# Patient Record
Sex: FEMALE | URBAN - METROPOLITAN AREA
[De-identification: names, ages, dates, MRNs, and addresses within clinical notes are randomized per-mention and may not be internally consistent; named-entity substitution may affect disease eponyms.]

---

## 2022-03-07 ENCOUNTER — APPOINTMENT (RX ONLY)
Dept: URBAN - METROPOLITAN AREA CLINIC 374 | Facility: CLINIC | Age: 32
Setting detail: DERMATOLOGY
End: 2022-03-07

## 2022-03-07 DIAGNOSIS — D22 MELANOCYTIC NEVI: ICD-10-CM

## 2022-03-07 DIAGNOSIS — L81.4 OTHER MELANIN HYPERPIGMENTATION: ICD-10-CM

## 2022-03-07 DIAGNOSIS — D18.0 HEMANGIOMA: ICD-10-CM

## 2022-03-07 DIAGNOSIS — D485 NEOPLASM OF UNCERTAIN BEHAVIOR OF SKIN: ICD-10-CM

## 2022-03-07 DIAGNOSIS — Z71.89 OTHER SPECIFIED COUNSELING: ICD-10-CM

## 2022-03-07 PROBLEM — D23.39 OTHER BENIGN NEOPLASM OF SKIN OF OTHER PARTS OF FACE: Status: ACTIVE | Noted: 2022-03-07

## 2022-03-07 PROBLEM — D22.61 MELANOCYTIC NEVI OF RIGHT UPPER LIMB, INCLUDING SHOULDER: Status: ACTIVE | Noted: 2022-03-07

## 2022-03-07 PROBLEM — D22.5 MELANOCYTIC NEVI OF TRUNK: Status: ACTIVE | Noted: 2022-03-07

## 2022-03-07 PROBLEM — D22.62 MELANOCYTIC NEVI OF LEFT UPPER LIMB, INCLUDING SHOULDER: Status: ACTIVE | Noted: 2022-03-07

## 2022-03-07 PROBLEM — D22.71 MELANOCYTIC NEVI OF RIGHT LOWER LIMB, INCLUDING HIP: Status: ACTIVE | Noted: 2022-03-07

## 2022-03-07 PROBLEM — D48.5 NEOPLASM OF UNCERTAIN BEHAVIOR OF SKIN: Status: ACTIVE | Noted: 2022-03-07

## 2022-03-07 PROBLEM — D18.01 HEMANGIOMA OF SKIN AND SUBCUTANEOUS TISSUE: Status: ACTIVE | Noted: 2022-03-07

## 2022-03-07 PROBLEM — D22.72 MELANOCYTIC NEVI OF LEFT LOWER LIMB, INCLUDING HIP: Status: ACTIVE | Noted: 2022-03-07

## 2022-03-07 PROCEDURE — ? ADDITIONAL NOTES

## 2022-03-07 PROCEDURE — ? SUNSCREEN RECOMMENDATIONS

## 2022-03-07 PROCEDURE — ? FULL BODY SKIN EXAM

## 2022-03-07 PROCEDURE — 99203 OFFICE O/P NEW LOW 30 MIN: CPT

## 2022-03-07 PROCEDURE — ? DEFER

## 2022-03-07 PROCEDURE — ? PHOTO-DOCUMENTATION

## 2022-03-07 PROCEDURE — ? COUNSELING

## 2022-03-07 ASSESSMENT — LOCATION DETAILED DESCRIPTION DERM
LOCATION DETAILED: RIGHT ANTERIOR PROXIMAL UPPER ARM
LOCATION DETAILED: LEFT ANTERIOR DISTAL THIGH
LOCATION DETAILED: LEFT SUPERIOR MEDIAL UPPER BACK
LOCATION DETAILED: RIGHT ANTERIOR DISTAL UPPER ARM
LOCATION DETAILED: RIGHT MEDIAL SUPERIOR CHEST
LOCATION DETAILED: RIGHT DISTAL DORSAL FOREARM
LOCATION DETAILED: LEFT ANTERIOR PROXIMAL THIGH
LOCATION DETAILED: LEFT MEDIAL EYEBROW
LOCATION DETAILED: LEFT SUPERIOR PARIETAL SCALP
LOCATION DETAILED: INFERIOR THORACIC SPINE
LOCATION DETAILED: LEFT ANTERIOR PROXIMAL UPPER ARM
LOCATION DETAILED: EPIGASTRIC SKIN
LOCATION DETAILED: RIGHT ANTERIOR DISTAL THIGH
LOCATION DETAILED: SUPERIOR THORACIC SPINE

## 2022-03-07 ASSESSMENT — LOCATION ZONE DERM
LOCATION ZONE: FACE
LOCATION ZONE: LEG
LOCATION ZONE: SCALP
LOCATION ZONE: TRUNK
LOCATION ZONE: ARM

## 2022-03-07 ASSESSMENT — LOCATION SIMPLE DESCRIPTION DERM
LOCATION SIMPLE: CHEST
LOCATION SIMPLE: LEFT THIGH
LOCATION SIMPLE: RIGHT THIGH
LOCATION SIMPLE: RIGHT FOREARM
LOCATION SIMPLE: LEFT UPPER BACK
LOCATION SIMPLE: RIGHT UPPER ARM
LOCATION SIMPLE: LEFT EYEBROW
LOCATION SIMPLE: LEFT UPPER ARM
LOCATION SIMPLE: UPPER BACK
LOCATION SIMPLE: SCALP
LOCATION SIMPLE: ABDOMEN

## 2022-03-07 NOTE — PROCEDURE: DEFER
Introduction Text (Please End With A Colon): The following procedure was deferred:
Detail Level: Zone
Procedure To Be Performed At Next Visit: Biopsy by shave method
Procedure To Be Performed At Next Visit: Shave Removal

## 2022-03-07 NOTE — PROCEDURE: FULL BODY SKIN EXAM
Detail Level: Generalized
Body Of Note (Please Add Your Own Text Here): monitor annually
Instructions: This plan will send the code FBSE to the PM system.  DO NOT or CHANGE the price.
Price (Do Not Change): 0.00

## 2022-03-30 ENCOUNTER — APPOINTMENT (RX ONLY)
Dept: URBAN - METROPOLITAN AREA CLINIC 374 | Facility: CLINIC | Age: 32
Setting detail: DERMATOLOGY
End: 2022-03-30

## 2022-03-30 DIAGNOSIS — D485 NEOPLASM OF UNCERTAIN BEHAVIOR OF SKIN: ICD-10-CM

## 2022-03-30 PROBLEM — D48.5 NEOPLASM OF UNCERTAIN BEHAVIOR OF SKIN: Status: ACTIVE | Noted: 2022-03-30

## 2022-03-30 PROBLEM — D23.39 OTHER BENIGN NEOPLASM OF SKIN OF OTHER PARTS OF FACE: Status: ACTIVE | Noted: 2022-03-30

## 2022-03-30 PROCEDURE — 11103 TANGNTL BX SKIN EA SEP/ADDL: CPT

## 2022-03-30 PROCEDURE — 11102 TANGNTL BX SKIN SINGLE LES: CPT

## 2022-03-30 PROCEDURE — ? BIOPSY BY SHAVE METHOD

## 2022-03-30 ASSESSMENT — LOCATION DETAILED DESCRIPTION DERM
LOCATION DETAILED: RIGHT DISTAL DORSAL FOREARM
LOCATION DETAILED: RIGHT ANTERIOR DISTAL THIGH

## 2022-03-30 ASSESSMENT — LOCATION ZONE DERM
LOCATION ZONE: ARM
LOCATION ZONE: LEG

## 2022-03-30 ASSESSMENT — LOCATION SIMPLE DESCRIPTION DERM
LOCATION SIMPLE: RIGHT THIGH
LOCATION SIMPLE: RIGHT FOREARM

## 2023-08-25 ENCOUNTER — HOSPITAL ENCOUNTER (EMERGENCY)
Facility: HOSPITAL | Age: 33
Discharge: HOME | End: 2023-08-25
Attending: EMERGENCY MEDICINE
Payer: COMMERCIAL

## 2023-08-25 VITALS
TEMPERATURE: 97.7 F | HEIGHT: 59 IN | RESPIRATION RATE: 14 BRPM | HEART RATE: 106 BPM | OXYGEN SATURATION: 100 % | DIASTOLIC BLOOD PRESSURE: 71 MMHG | SYSTOLIC BLOOD PRESSURE: 113 MMHG | BODY MASS INDEX: 29.07 KG/M2 | WEIGHT: 144.18 LBS

## 2023-08-25 DIAGNOSIS — R42 LIGHTHEADEDNESS: ICD-10-CM

## 2023-08-25 DIAGNOSIS — R10.2 PELVIC PAIN: Primary | ICD-10-CM

## 2023-08-25 LAB
AMORPH URATE CRY URNS QL MICRO: ABNORMAL
ANION GAP SERPL CALCULATED.3IONS-SCNC: 6 MMOL/L
ATRIAL RATE: 107 BPM
BACTERIA UR QL AUTO: ABNORMAL /HPF
BASOPHILS # BLD AUTO: 0.06 THOUSANDS/ÂΜL (ref 0–0.1)
BASOPHILS NFR BLD AUTO: 0 % (ref 0–1)
BILIRUB UR QL STRIP: NEGATIVE
BUN SERPL-MCNC: 15 MG/DL (ref 5–25)
CALCIUM SERPL-MCNC: 9.3 MG/DL (ref 8.4–10.2)
CHLORIDE SERPL-SCNC: 104 MMOL/L (ref 96–108)
CLARITY UR: ABNORMAL
CO2 SERPL-SCNC: 28 MMOL/L (ref 21–32)
COLOR UR: ABNORMAL
CREAT SERPL-MCNC: 0.6 MG/DL (ref 0.6–1.3)
EOSINOPHIL # BLD AUTO: 0.03 THOUSAND/ÂΜL (ref 0–0.61)
EOSINOPHIL NFR BLD AUTO: 0 % (ref 0–6)
ERYTHROCYTE [DISTWIDTH] IN BLOOD BY AUTOMATED COUNT: 11.5 % (ref 11.6–15.1)
EXT PREGNANCY TEST URINE: NEGATIVE
EXT. CONTROL: NORMAL
GFR SERPL CREATININE-BSD FRML MDRD: 118 ML/MIN/1.73SQ M
GLUCOSE SERPL-MCNC: 100 MG/DL (ref 65–140)
GLUCOSE UR STRIP-MCNC: NEGATIVE MG/DL
HCT VFR BLD AUTO: 42.3 % (ref 34.8–46.1)
HGB BLD-MCNC: 14.1 G/DL (ref 11.5–15.4)
HGB UR QL STRIP.AUTO: ABNORMAL
IMM GRANULOCYTES # BLD AUTO: 0.09 THOUSAND/UL (ref 0–0.2)
IMM GRANULOCYTES NFR BLD AUTO: 1 % (ref 0–2)
KETONES UR STRIP-MCNC: ABNORMAL MG/DL
LEUKOCYTE ESTERASE UR QL STRIP: NEGATIVE
LYMPHOCYTES # BLD AUTO: 1.1 THOUSANDS/ÂΜL (ref 0.6–4.47)
LYMPHOCYTES NFR BLD AUTO: 6 % (ref 14–44)
MCH RBC QN AUTO: 30.9 PG (ref 26.8–34.3)
MCHC RBC AUTO-ENTMCNC: 33.3 G/DL (ref 31.4–37.4)
MCV RBC AUTO: 93 FL (ref 82–98)
MONOCYTES # BLD AUTO: 0.9 THOUSAND/ÂΜL (ref 0.17–1.22)
MONOCYTES NFR BLD AUTO: 5 % (ref 4–12)
MUCOUS THREADS UR QL AUTO: ABNORMAL
NEUTROPHILS # BLD AUTO: 16.11 THOUSANDS/ÂΜL (ref 1.85–7.62)
NEUTS SEG NFR BLD AUTO: 88 % (ref 43–75)
NITRITE UR QL STRIP: NEGATIVE
NON-SQ EPI CELLS URNS QL MICRO: ABNORMAL /HPF
NRBC BLD AUTO-RTO: 0 /100 WBCS
P AXIS: 73 DEGREES
PH UR STRIP.AUTO: 7 [PH] (ref 4.5–8)
PLATELET # BLD AUTO: 333 THOUSANDS/UL (ref 149–390)
PMV BLD AUTO: 9.8 FL (ref 8.9–12.7)
POTASSIUM SERPL-SCNC: 3.6 MMOL/L (ref 3.5–5.3)
PR INTERVAL: 138 MS
PROT UR STRIP-MCNC: ABNORMAL MG/DL
QRS AXIS: 79 DEGREES
QRSD INTERVAL: 80 MS
QT INTERVAL: 338 MS
QTC INTERVAL: 451 MS
RBC # BLD AUTO: 4.57 MILLION/UL (ref 3.81–5.12)
RBC #/AREA URNS AUTO: ABNORMAL /HPF
SODIUM SERPL-SCNC: 138 MMOL/L (ref 135–147)
SP GR UR STRIP.AUTO: 1.02 (ref 1–1.03)
T WAVE AXIS: 73 DEGREES
UROBILINOGEN UR QL STRIP.AUTO: 0.2 E.U./DL
VENTRICULAR RATE: 107 BPM
WBC # BLD AUTO: 18.29 THOUSAND/UL (ref 4.31–10.16)
WBC #/AREA URNS AUTO: ABNORMAL /HPF

## 2023-08-25 PROCEDURE — 99284 EMERGENCY DEPT VISIT MOD MDM: CPT

## 2023-08-25 PROCEDURE — 81001 URINALYSIS AUTO W/SCOPE: CPT

## 2023-08-25 PROCEDURE — 80048 BASIC METABOLIC PNL TOTAL CA: CPT | Performed by: EMERGENCY MEDICINE

## 2023-08-25 PROCEDURE — 93010 ELECTROCARDIOGRAM REPORT: CPT | Performed by: INTERNAL MEDICINE

## 2023-08-25 PROCEDURE — 81025 URINE PREGNANCY TEST: CPT | Performed by: EMERGENCY MEDICINE

## 2023-08-25 PROCEDURE — 36415 COLL VENOUS BLD VENIPUNCTURE: CPT | Performed by: EMERGENCY MEDICINE

## 2023-08-25 PROCEDURE — 87086 URINE CULTURE/COLONY COUNT: CPT

## 2023-08-25 PROCEDURE — 93005 ELECTROCARDIOGRAM TRACING: CPT

## 2023-08-25 PROCEDURE — 87147 CULTURE TYPE IMMUNOLOGIC: CPT

## 2023-08-25 PROCEDURE — 85025 COMPLETE CBC W/AUTO DIFF WBC: CPT | Performed by: EMERGENCY MEDICINE

## 2023-08-25 PROCEDURE — 99285 EMERGENCY DEPT VISIT HI MDM: CPT | Performed by: EMERGENCY MEDICINE

## 2023-08-25 RX ORDER — KETOROLAC TROMETHAMINE 30 MG/ML
15 INJECTION, SOLUTION INTRAMUSCULAR; INTRAVENOUS ONCE
Status: DISCONTINUED | OUTPATIENT
Start: 2023-08-25 | End: 2023-08-25 | Stop reason: HOSPADM

## 2023-08-25 RX ORDER — NAPROXEN 375 MG/1
375 TABLET ORAL 2 TIMES DAILY WITH MEALS
Qty: 20 TABLET | Refills: 0 | Status: SHIPPED | OUTPATIENT
Start: 2023-08-25

## 2023-08-25 NOTE — Clinical Note
Sara Ramon was seen and treated in our emergency department on 8/25/2023. Diagnosis:     Gillian Lew  may return to work on return date. She may return on this date: 08/28/2023         If you have any questions or concerns, please don't hesitate to call.       Oksana Mosher MD    ______________________________           _______________          _______________  Hospital Representative                              Date                                Time

## 2023-08-25 NOTE — Clinical Note
Diana Reaves was seen and treated in our emergency department on 8/25/2023. Diagnosis:     Felisa Pedraza  may return to work on return date. She may return on this date: 08/28/2023         If you have any questions or concerns, please don't hesitate to call.       Fariha Matson MD    ______________________________           _______________          _______________  Hospital Representative                              Date                                Time

## 2023-08-25 NOTE — ED PROVIDER NOTES
History  Chief Complaint   Patient presents with   • Pelvic Pain     Patient brought by EMS from work. Reports suprapubic cramping since this morning shortly after onset of menses. Nausea. Nick Cook is a pleasant 75-year-old female here for evaluation of pelvic cramping and lightheadedness. Patient notes that she started her menstrual cycle today and while at work she was having typical menstrual cramps. In addition to this, while she was having more intense period of cramping she began to feel somewhat lightheaded/faint. She reports that she was also feeling somewhat stressed due to things that were going on at work. She was hyperventilating to some extent and began to have tingling in her fingertips. Because of this EMS was called and brought her from work to the emergency department. Here during my initial evaluation she essentially has no complaints. She states that she is intermittently having some mild pelvic cramping but nothing more severe than she would expect during a typical menstrual cycle. History provided by:  Patient      None       History reviewed. No pertinent past medical history. History reviewed. No pertinent surgical history. History reviewed. No pertinent family history. I have reviewed and agree with the history as documented. E-Cigarette/Vaping     E-Cigarette/Vaping Substances     Social History     Tobacco Use   • Smoking status: Never   • Smokeless tobacco: Never   Substance Use Topics   • Alcohol use: Not Currently   • Drug use: Never       Review of Systems   Constitutional: Negative for chills and fever. HENT: Negative for sore throat. Eyes: Negative for visual disturbance. Respiratory: Negative for cough and shortness of breath. Cardiovascular: Negative for chest pain and palpitations. Gastrointestinal: Negative for abdominal pain, nausea and vomiting. Genitourinary: Positive for pelvic pain. Negative for dysuria and hematuria.         Having typical pelvic cramping with typical menstrual cycle. Musculoskeletal: Negative for arthralgias and back pain. Skin: Negative for color change and rash. Neurological: Negative for syncope. All other systems reviewed and are negative. Physical Exam  Physical Exam  Constitutional:       Appearance: Normal appearance. She is obese. She is not toxic-appearing. HENT:      Head: Normocephalic and atraumatic. Nose: Nose normal.      Mouth/Throat:      Mouth: Mucous membranes are moist.      Pharynx: Oropharynx is clear. Eyes:      Extraocular Movements: Extraocular movements intact. Conjunctiva/sclera: Conjunctivae normal.      Pupils: Pupils are equal, round, and reactive to light. Cardiovascular:      Rate and Rhythm: Normal rate and regular rhythm. Pulses: Normal pulses. Pulmonary:      Effort: Pulmonary effort is normal. No respiratory distress. Breath sounds: Normal breath sounds. Abdominal:      General: There is no distension. Palpations: Abdomen is soft. Tenderness: There is no abdominal tenderness. Musculoskeletal:         General: No swelling, tenderness or signs of injury. Normal range of motion. Cervical back: Normal range of motion and neck supple. No rigidity. Skin:     General: Skin is warm and dry. Capillary Refill: Capillary refill takes less than 2 seconds. Findings: No rash. Neurological:      General: No focal deficit present. Mental Status: She is alert and oriented to person, place, and time. Psychiatric:         Mood and Affect: Mood normal.         Thought Content:  Thought content normal.         Vital Signs  ED Triage Vitals [08/25/23 1236]   Temperature Pulse Respirations Blood Pressure SpO2   97.7 °F (36.5 °C) 96 16 120/72 100 %      Temp Source Heart Rate Source Patient Position - Orthostatic VS BP Location FiO2 (%)   Oral Monitor Sitting Right arm --      Pain Score       No Pain           Vitals:    08/25/23 1236 08/25/23 1546   BP: 120/72 113/71   Pulse: 96 (!) 106   Patient Position - Orthostatic VS: Sitting Sitting         Visual Acuity      ED Medications  Medications   ketorolac (TORADOL) injection 15 mg (15 mg Intravenous Not Given 8/25/23 1358)       Diagnostic Studies  Results Reviewed     Procedure Component Value Units Date/Time    Urine Microscopic [[id number]]  (Abnormal) Collected: 08/25/23 1347    Lab Status: Final result Specimen: Urine, Clean Catch Updated: 08/25/23 1528     RBC, UA Innumerable /hpf      WBC, UA 30-50 /hpf      Epithelial Cells Occasional /hpf      Bacteria, UA Moderate /hpf      MUCUS THREADS Innumerable     Amorphous Crystals, UA Occasional    Urine culture [[id number]] Collected: 08/25/23 1347    Lab Status:  In process Specimen: Urine, Clean Catch Updated: 08/25/23 0272    Basic metabolic panel [[id number]] Collected: 08/25/23 1345    Lab Status: Final result Specimen: Blood from Arm, Left Updated: 08/25/23 1407     Sodium 138 mmol/L      Potassium 3.6 mmol/L      Chloride 104 mmol/L      CO2 28 mmol/L      ANION GAP 6 mmol/L      BUN 15 mg/dL      Creatinine 0.60 mg/dL      Glucose 100 mg/dL      Calcium 9.3 mg/dL      eGFR 118 ml/min/1.73sq m     Narrative:      [place] guidelines for Chronic Kidney Disease (CKD):   •  Stage 1 with normal or high GFR (GFR > 90 mL/min/1.73 square meters)  •  Stage 2 Mild CKD (GFR = 60-89 mL/min/1.73 square meters)  •  Stage 3A Moderate CKD (GFR = 45-59 mL/min/1.73 square meters)  •  Stage 3B Moderate CKD (GFR = 30-44 mL/min/1.73 square meters)  •  Stage 4 Severe CKD (GFR = 15-29 mL/min/1.73 square meters)  •  Stage 5 End Stage CKD (GFR <15 mL/min/1.73 square meters)  Note: GFR calculation is accurate only with a steady state creatinine    POCT pregnancy, urine [[id number]]  (Normal) Resulted: 08/25/23 1345    Lab Status: Final result Updated: 08/25/23 1352     EXT Preg Test, Ur Negative     Control Valid    CBC and differential [[id number]]  (Abnormal) Collected: 08/25/23 1345    Lab Status: Final result Specimen: Blood from Arm, Left Updated: 08/25/23 1351     WBC 18.29 Thousand/uL      RBC 4.57 Million/uL      Hemoglobin 14.1 g/dL      Hematocrit 42.3 %      MCV 93 fL      MCH 30.9 pg      MCHC 33.3 g/dL      RDW 11.5 %      MPV 9.8 fL      Platelets 370 Thousands/uL      nRBC 0 /100 WBCs      Neutrophils Relative 88 %      Immat GRANS % 1 %      Lymphocytes Relative 6 %      Monocytes Relative 5 %      Eosinophils Relative 0 %      Basophils Relative 0 %      Neutrophils Absolute 16.11 Thousands/µL      Immature Grans Absolute 0.09 Thousand/uL      Lymphocytes Absolute 1.10 Thousands/µL      Monocytes Absolute 0.90 Thousand/µL      Eosinophils Absolute 0.03 Thousand/µL      Basophils Absolute 0.06 Thousands/µL     Urine Macroscopic, POC [[id number]]  (Abnormal) Collected: 08/25/23 1347    Lab Status: Final result Specimen: Urine Updated: 08/25/23 1349     Color, UA Orange     Clarity, UA Turbid     pH, UA 7.0     Leukocytes, UA Negative     Nitrite, UA Negative     Protein, UA 30 (1+) mg/dl      Glucose, UA Negative mg/dl      Ketones, UA Trace mg/dl      Urobilinogen, UA 0.2 E.U./dl      Bilirubin, UA Negative     Occult Blood, UA Large     Specific Gravity, UA 1.025    Narrative:      CLIN[facility] RESULT                 No orders to display              Procedures  Procedures         ED Course                                             Medical Decision Making  77-year-old female had an episode of lightheadedness and hyperventilation while at work today. Having some pelvic cramping consistent with start of a typical menstrual cycle. Abdominal exam here in the emergency department is benign. No evidence of urinary tract infection. Urine pregnancy test was negative. Given episode of lightheadedness/dizziness EKG, CBC, BMP checked to rule out cardiac arrhythmia, significant anemia, or severe electrolyte derangements. All labs reassuring. Reassuring physical exam.  Plan for discharge with outpatient follow-up and return precautions. Lightheadedness: acute illness or injury  Pelvic pain: acute illness or injury  Amount and/or Complexity of Data Reviewed  Labs: ordered. Decision-making details documented in ED Course. ECG/medicine tests: ordered and independent interpretation performed. Risk  Prescription drug management. Disposition  Final diagnoses:   Pelvic pain   Lightheadedness     Time reflects when diagnosis was documented in both MDM as applicable and the Disposition within this note     Time User Action Codes Description Comment    8/25/2023  3:05 PM [place]al [place] Add [R10.2] Pelvic pain     8/25/2023  3:34 PM [place]al [place] Add [R42] [facility]       ED Disposition     ED Disposition   Discharge    Condition   Stable    Date/Time   Fri Aug 25, 2023  3:05 PM    Comment   Darrell Martin discharge to home/self care. Follow-up Information     Follow up With Specialties Details Why Contact Info Additional [facility]   [address] [facility] [address]  [address] [place] [address], [facility] [address], [address] [phone]          Patient's Medications   Discharge Prescriptions    NAPROXEN (NAPROSYN) 375 MG TABLET    Take 1 tablet (375 mg total) by mouth 2 (two) times a day with meals       Start Date: 8/25/2023 End Date: --       Order Dose: 375 mg       Quantity: 20 tablet    Refills: 0       No discharge procedures on file.     PDMP Review     None          ED Provider  Electronically Signed by           Mima Arias MD  08/25/23 2579

## 2023-08-27 LAB
BACTERIA UR CULT: ABNORMAL
BACTERIA UR CULT: ABNORMAL

## 2023-11-29 ENCOUNTER — APPOINTMENT (OUTPATIENT)
Dept: RADIOLOGY | Facility: CLINIC | Age: 33
End: 2023-11-29
Payer: COMMERCIAL

## 2023-11-29 ENCOUNTER — OFFICE VISIT (OUTPATIENT)
Dept: OBGYN CLINIC | Facility: CLINIC | Age: 33
End: 2023-11-29
Payer: COMMERCIAL

## 2023-11-29 VITALS
SYSTOLIC BLOOD PRESSURE: 112 MMHG | WEIGHT: 144 LBS | HEIGHT: 59 IN | BODY MASS INDEX: 29.03 KG/M2 | DIASTOLIC BLOOD PRESSURE: 72 MMHG

## 2023-11-29 DIAGNOSIS — M25.562 ACUTE PAIN OF LEFT KNEE: ICD-10-CM

## 2023-11-29 DIAGNOSIS — G89.29 CHRONIC PAIN OF LEFT KNEE: Primary | ICD-10-CM

## 2023-11-29 DIAGNOSIS — M25.562 CHRONIC PAIN OF LEFT KNEE: Primary | ICD-10-CM

## 2023-11-29 PROCEDURE — 99204 OFFICE O/P NEW MOD 45 MIN: CPT | Performed by: ORTHOPAEDIC SURGERY

## 2023-11-29 PROCEDURE — 73564 X-RAY EXAM KNEE 4 OR MORE: CPT

## 2023-11-29 RX ORDER — MULTIVITAMIN
1 TABLET ORAL DAILY
COMMUNITY

## 2023-11-29 NOTE — PROGRESS NOTES
Assessment:     1. Chronic pain of left knee        Plan:     Problem List Items Addressed This Visit          Other    Chronic pain of left knee - Primary     Findings consistent with chronic left knee pain, concern for anteromedial plica. Findings and treatment options were discussed with the patient. X-rays were reviewed with her. Patient continues to have symptoms despite long-term conservative treatment with formal physical therapy, over-the-counter medications and bracing. Recommend arthrogram MRI of the left knee to further evaluate the joint. She will follow-up after the study and I will go over further treat recommendations at that time. All patient's questions were answered to her satisfaction. This note is created using dictation transcription. It may contain typographical errors, grammatical errors, improperly dictated words, background noise and other errors. Relevant Orders    XR knee 4+ vw left injury    MRI arthrogram left knee    FL injection left knee (arthrogram)      Subjective:     Patient ID: Carri Marrero is a 26 y.o. female. Chief Complaint:  Patient is a 26 y.o. female presenting with left knee pain. Patient reports that in 2018 it started hurting, but there was no acute injury or fall on the knee at the time. She says she had issues with her right knee since the early 2000s so she compensated by putting more weight on her left knee, which she believes is the cause of the pain. She saw her PCP in 2018, who believed it was a ligament problem and sent her to PT. She went to PT for several months and had no improvement so she tried to get an MRI. She reports that she wasn't approved for an MRI so she returned to PT and they started to focus on her patellar tendon, as they thought that was the problem. She says she was also given a patellar tendon brace at that time and bought a knee brace for support.   She reports constantly needing the knee brace because of the pain and instability she feels in her knee. She says she went to PT for 7 months in 2020 for her knee as it was worsening and had minimal improvement. She says that between 2020 and now there has been less pain but that she recently got a new PCP who referred her to orthopedics. Currently, she states that the pain is primarily on the anterior aspect of her left knee, around the patella. She describes it as an aching pain that is made worse with weightbearing and twisting. She reports no pain on the lateral, medial, or posterior aspects. She says that sometimes the pain travels up and down her legs and along her IT band. Patient says she works out a lot and has to use modified exercises for her knee due to the pain and instability. She states her knee has never given out but sometimes she has to sit down because it feels like it could. She occasionally uses OTC pain meds, ice, and epsom salt baths which she says help. She has had no acute injuries or falls on the knee and has no prior surgeries to the knee. Allergy:  No Known Allergies  Medications:  all current active meds have been reviewed  Past Medical History:  History reviewed. No pertinent past medical history. Past Surgical History:  Past Surgical History:   Procedure Laterality Date    WISDOM TOOTH EXTRACTION       Family History:  History reviewed. No pertinent family history. Social History:  Social History     Substance and Sexual Activity   Alcohol Use Not Currently     Social History     Substance and Sexual Activity   Drug Use Never     Social History     Tobacco Use   Smoking Status Never   Smokeless Tobacco Never     Review of Systems   Constitutional: Negative. HENT: Negative. Eyes: Negative. Respiratory: Negative. Cardiovascular: Negative. Gastrointestinal: Negative. Endocrine: Negative. Genitourinary: Negative. Musculoskeletal:  Positive for arthralgias (left knee) and gait problem (Antalgic).    Skin: Negative. Allergic/Immunologic: Negative. Hematological: Negative. Psychiatric/Behavioral: Negative. Objective:  BP Readings from Last 1 Encounters:   11/29/23 112/72      Wt Readings from Last 1 Encounters:   11/29/23 65.3 kg (144 lb)      BMI:   Estimated body mass index is 29.08 kg/m² as calculated from the following:    Height as of this encounter: 4' 11" (1.499 m). Weight as of this encounter: 65.3 kg (144 lb). BSA:   Estimated body surface area is 1.6 meters squared as calculated from the following:    Height as of this encounter: 4' 11" (1.499 m). Weight as of this encounter: 65.3 kg (144 lb). Physical Exam  Vitals and nursing note reviewed. Constitutional:       General: She is not in acute distress. Appearance: Normal appearance. She is well-developed. HENT:      Head: Normocephalic and atraumatic. Right Ear: External ear normal.      Left Ear: External ear normal.   Eyes:      Extraocular Movements: Extraocular movements intact. Conjunctiva/sclera: Conjunctivae normal.   Pulmonary:      Effort: Pulmonary effort is normal. No respiratory distress. Musculoskeletal:      Cervical back: Neck supple. Left knee: No effusion. Instability Tests: Medial Ayana test negative and lateral Ayana test negative. Skin:     General: Skin is warm and dry. Neurological:      Mental Status: She is alert and oriented to person, place, and time. Deep Tendon Reflexes: Reflexes are normal and symmetric. Psychiatric:         Mood and Affect: Mood normal.         Behavior: Behavior normal.       Left Knee Exam     Tenderness   Left knee tenderness location: medial patella. Range of Motion   Extension:  abnormal Left knee extension: Stiffness and pain.   Flexion:  normal     Tests   Ayana:  Medial - negative Lateral - negative  Varus: negative Valgus: negative  Lachman:  Anterior - negative    Posterior - negative  Drawer:  Anterior - negative Posterior - negative  Patellar apprehension: negative    Other   Erythema: absent  Scars: absent  Sensation: normal  Pulse: present  Swelling: none  Effusion: no effusion present    Comments:  Patient guarding during exam            I have personally reviewed pertinent films in PACS and my interpretation is x-rays of the left knee reveal no bony abnormalities. No soft tissue calcifications. No osteoarthritis. Good joint alignment.     Scribe Attestation      I,:  Carlos Manuel Miller PA-C am acting as a scribe while in the presence of the attending physician.:       I,:  Hitesh Love MD personally performed the services described in this documentation    as scribed in my presence.:

## 2023-11-29 NOTE — ASSESSMENT & PLAN NOTE
Findings consistent with chronic left knee pain, concern for anteromedial plica. Findings and treatment options were discussed with the patient. X-rays were reviewed with her. Patient continues to have symptoms despite long-term conservative treatment with formal physical therapy, over-the-counter medications and bracing. Recommend arthrogram MRI of the left knee to further evaluate the joint. She will follow-up after the study and I will go over further treat recommendations at that time. All patient's questions were answered to her satisfaction. This note is created using dictation transcription. It may contain typographical errors, grammatical errors, improperly dictated words, background noise and other errors.

## 2024-01-02 ENCOUNTER — HOSPITAL ENCOUNTER (OUTPATIENT)
Dept: RADIOLOGY | Facility: HOSPITAL | Age: 34
Discharge: HOME | End: 2024-01-02
Admitting: RADIOLOGY
Payer: COMMERCIAL

## 2024-01-02 ENCOUNTER — HOSPITAL ENCOUNTER (OUTPATIENT)
Dept: MRI IMAGING | Facility: HOSPITAL | Age: 34
Discharge: HOME | End: 2024-01-02
Payer: COMMERCIAL

## 2024-01-02 DIAGNOSIS — M25.562 CHRONIC PAIN OF LEFT KNEE: ICD-10-CM

## 2024-01-02 DIAGNOSIS — G89.29 CHRONIC PAIN OF LEFT KNEE: ICD-10-CM

## 2024-01-02 PROCEDURE — A9585 GADOBUTROL INJECTION: HCPCS | Performed by: PHYSICIAN ASSISTANT

## 2024-01-02 PROCEDURE — 77002 NEEDLE LOCALIZATION BY XRAY: CPT

## 2024-01-02 PROCEDURE — 73722 MRI JOINT OF LWR EXTR W/DYE: CPT

## 2024-01-02 PROCEDURE — 27369 NJX CNTRST KNE ARTHG/CT/MRI: CPT

## 2024-01-02 PROCEDURE — G1004 CDSM NDSC: HCPCS

## 2024-01-02 RX ORDER — LIDOCAINE HYDROCHLORIDE 10 MG/ML
30 INJECTION, SOLUTION EPIDURAL; INFILTRATION; INTRACAUDAL; PERINEURAL
Status: COMPLETED | OUTPATIENT
Start: 2024-01-02 | End: 2024-01-02

## 2024-01-02 RX ORDER — GADOBUTROL 604.72 MG/ML
2 INJECTION INTRAVENOUS
Status: COMPLETED | OUTPATIENT
Start: 2024-01-02 | End: 2024-01-02

## 2024-01-02 RX ADMIN — GADOBUTROL 1 ML: 604.72 INJECTION INTRAVENOUS at 10:11

## 2024-01-02 RX ADMIN — IOHEXOL 1 ML: 300 INJECTION, SOLUTION INTRAVENOUS at 10:11

## 2024-01-02 RX ADMIN — LIDOCAINE HYDROCHLORIDE 4 ML: 10 INJECTION, SOLUTION EPIDURAL; INFILTRATION; INTRACAUDAL; PERINEURAL at 10:10

## 2024-01-17 ENCOUNTER — OFFICE VISIT (OUTPATIENT)
Dept: OBGYN CLINIC | Facility: CLINIC | Age: 34
End: 2024-01-17
Payer: COMMERCIAL

## 2024-01-17 VITALS
HEIGHT: 59 IN | WEIGHT: 144 LBS | SYSTOLIC BLOOD PRESSURE: 118 MMHG | BODY MASS INDEX: 29.03 KG/M2 | DIASTOLIC BLOOD PRESSURE: 72 MMHG

## 2024-01-17 DIAGNOSIS — M67.52 SYNOVIAL PLICA SYNDROME OF LEFT KNEE: Primary | ICD-10-CM

## 2024-01-17 PROCEDURE — 99214 OFFICE O/P EST MOD 30 MIN: CPT | Performed by: ORTHOPAEDIC SURGERY

## 2024-01-17 NOTE — ASSESSMENT & PLAN NOTE
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  Discussed findings and treatment options with the patient.  I reviewed patient's left knee arthrogram MRI with her and her mother.  Discussed prognosis of her knee condition.  I discussed surgical versus nonsurgical interventions.  With her chronic symptoms, I recommended surgical interventions.  Patient will consider her options and contact us with her decision.  All patient's questions were answered to their satisfaction.  This note is created using dictation transcription.  It may contain typographical errors, grammatical errors, improperly dictated words, background noise and other errors.

## 2024-01-17 NOTE — PROGRESS NOTES
Assessment:     1. Synovial plica syndrome of left knee        Plan:     Problem List Items Addressed This Visit          Musculoskeletal and Integument    Synovial plica syndrome of left knee - Primary     Findings consistent with left knee anteromedial plica syndrome.  Discussed findings and treatment options with the patient.  I reviewed patient's left knee arthrogram MRI with her and her mother.  Discussed prognosis of her knee condition.  I discussed surgical versus nonsurgical interventions.  With her chronic symptoms, I recommended surgical interventions.  Patient will consider her options and contact us with her decision.  All patient's questions were answered to their satisfaction.  This note is created using dictation transcription.  It may contain typographical errors, grammatical errors, improperly dictated words, background noise and other errors.           Subjective:     Patient ID: Alisha Luna is a 33 y.o. female.  Chief Complaint:  Patient is a 33 y.o. female presenting with left knee pain.  Patient reports that in 2018 it started hurting, but there was no acute injury or fall on the knee at the time.  She says she had issues with her right knee since the early 2000s so she compensated by putting more weight on her left knee, which she believes is the cause of the pain.  She saw her PCP in 2018, who believed it was a ligament problem and sent her to PT.  She went to PT for several months and had no improvement so she tried to get an MRI.  She reports that she wasn't approved for an MRI so she returned to PT and they started to focus on her patellar tendon, as they thought that was the problem.  She says she was also given a patellar tendon brace at that time and bought a knee brace for support.  She reports constantly needing the knee brace because of the pain and instability she feels in her knee.  She says she went to PT for 7 months in 2020 for her knee as it was worsening and had minimal  improvement.  She says that between 2020 and now there has been less pain but that she recently got a new PCP who referred her to orthopedics.  Currently, she states that the pain is primarily on the anterior aspect of her left knee, around the patella.  She describes it as an aching pain that is made worse with weightbearing and twisting.  She reports no pain on the lateral, medial, or posterior aspects.  She says that sometimes the pain travels up and down her legs and along her IT band.  Patient says she works out a lot and has to use modified exercises for her knee due to the pain and instability.  She states her knee has never given out but sometimes she has to sit down because it feels like it could.  She occasionally uses OTC pain meds, ice, and epsom salt baths which she says help.  She has had no acute injuries or falls on the knee and has no prior surgeries to the knee.  Patient is here to review her left knee arthrogram MRI.      Allergy:  No Known Allergies  Medications:  all current active meds have been reviewed  Past Medical History:  History reviewed. No pertinent past medical history.  Past Surgical History:  Past Surgical History:   Procedure Laterality Date    FL INJECTION LEFT KNEE (ARTHROGRAM)  1/2/2024    WISDOM TOOTH EXTRACTION       Family History:  History reviewed. No pertinent family history.  Social History:  Social History     Substance and Sexual Activity   Alcohol Use Not Currently     Social History     Substance and Sexual Activity   Drug Use Never     Social History     Tobacco Use   Smoking Status Never   Smokeless Tobacco Never     Review of Systems   Constitutional: Negative.    HENT: Negative.     Eyes: Negative.    Respiratory: Negative.     Cardiovascular: Negative.    Gastrointestinal: Negative.    Endocrine: Negative.    Genitourinary: Negative.    Musculoskeletal:  Positive for arthralgias (left knee) and gait problem (Antalgic).   Skin: Negative.    Allergic/Immunologic:  "Negative.    Hematological: Negative.    Psychiatric/Behavioral: Negative.           Objective:  BP Readings from Last 1 Encounters:   01/17/24 118/72      Wt Readings from Last 1 Encounters:   01/17/24 65.3 kg (144 lb)      BMI:   Estimated body mass index is 29.08 kg/m² as calculated from the following:    Height as of this encounter: 4' 11\" (1.499 m).    Weight as of this encounter: 65.3 kg (144 lb).  BSA:   Estimated body surface area is 1.6 meters squared as calculated from the following:    Height as of this encounter: 4' 11\" (1.499 m).    Weight as of this encounter: 65.3 kg (144 lb).   Physical Exam  Vitals and nursing note reviewed.   Constitutional:       General: She is not in acute distress.     Appearance: Normal appearance. She is well-developed.   HENT:      Head: Normocephalic and atraumatic.      Right Ear: External ear normal.      Left Ear: External ear normal.   Eyes:      Extraocular Movements: Extraocular movements intact.      Conjunctiva/sclera: Conjunctivae normal.   Pulmonary:      Effort: Pulmonary effort is normal. No respiratory distress.   Musculoskeletal:      Cervical back: Neck supple.      Left knee: No effusion.      Instability Tests: Medial Ayana test negative and lateral Ayana test negative.   Skin:     General: Skin is warm and dry.   Neurological:      Mental Status: She is alert and oriented to person, place, and time.      Deep Tendon Reflexes: Reflexes are normal and symmetric.   Psychiatric:         Mood and Affect: Mood normal.         Behavior: Behavior normal.       Left Knee Exam     Tenderness   Left knee tenderness location: medial patella.    Range of Motion   Extension:  abnormal Left knee extension: Stiffness and pain.  Flexion:  normal     Tests   Ayana:  Medial - negative Lateral - negative  Varus: negative Valgus: negative  Lachman:  Anterior - negative    Posterior - negative  Drawer:  Anterior - negative     Posterior - negative  Patellar " apprehension: negative    Other   Erythema: absent  Scars: absent  Sensation: normal  Pulse: present  Swelling: none  Effusion: no effusion present    Comments:  Patient guarding during exam            I have personally reviewed pertinent films in PACS and my interpretation is arthrogram MRI left knee show intact ACL, PCL, LCL, MCL, and meniscus.  There is a large size redundant synovium tissue along the anteromedial aspect of the knee consistent with anteromedial plica.

## 2024-03-26 ENCOUNTER — OFFICE VISIT (OUTPATIENT)
Dept: OBGYN CLINIC | Facility: CLINIC | Age: 34
End: 2024-03-26
Payer: COMMERCIAL

## 2024-03-26 VITALS
BODY MASS INDEX: 30.08 KG/M2 | SYSTOLIC BLOOD PRESSURE: 118 MMHG | WEIGHT: 149.2 LBS | DIASTOLIC BLOOD PRESSURE: 80 MMHG | HEIGHT: 59 IN

## 2024-03-26 DIAGNOSIS — M67.52 SYNOVIAL PLICA SYNDROME OF LEFT KNEE: Primary | ICD-10-CM

## 2024-03-26 PROCEDURE — 99214 OFFICE O/P EST MOD 30 MIN: CPT | Performed by: ORTHOPAEDIC SURGERY

## 2024-03-26 RX ORDER — CHLORHEXIDINE GLUCONATE ORAL RINSE 1.2 MG/ML
15 SOLUTION DENTAL ONCE
OUTPATIENT
Start: 2024-03-26 | End: 2024-03-26

## 2024-03-26 RX ORDER — CHLORHEXIDINE GLUCONATE 4 G/100ML
SOLUTION TOPICAL DAILY PRN
OUTPATIENT
Start: 2024-03-26

## 2024-03-26 NOTE — ASSESSMENT & PLAN NOTE
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  Discussed findings and treatment options with the patient.  I reviewed patient's left knee arthrogram MRI with her and her mother again.  Discussed prognosis of her knee condition.  I discussed surgical versus nonsurgical interventions.  With her chronic symptoms, I recommended surgical interventions.  Patient would like to proceed with surgical interventions with left knee arthroscopy and resection of anteromedial plica.  All patient's questions were answered to their satisfaction.  This note is created using dictation transcription.  It may contain typographical errors, grammatical errors, improperly dictated words, background noise and other errors.     Discussed with patient surgical risks and complications including but not limited to infection, persistent pain, nerve and vessel injury, complications associated with anesthesia, DVT, exposure to COVID virus, etc.  Patient understands the risks and complication and consented to the surgery.

## 2024-03-26 NOTE — H&P (VIEW-ONLY)
Assessment:     1. Synovial plica syndrome of left knee        Plan:     Problem List Items Addressed This Visit          Musculoskeletal and Integument    Synovial plica syndrome of left knee - Primary     Findings consistent with left knee anteromedial plica syndrome.  Discussed findings and treatment options with the patient.  I reviewed patient's left knee arthrogram MRI with her and her mother again.  Discussed prognosis of her knee condition.  I discussed surgical versus nonsurgical interventions.  With her chronic symptoms, I recommended surgical interventions.  Patient would like to proceed with surgical interventions with left knee arthroscopy and resection of anteromedial plica.  All patient's questions were answered to their satisfaction.  This note is created using dictation transcription.  It may contain typographical errors, grammatical errors, improperly dictated words, background noise and other errors.     Discussed with patient surgical risks and complications including but not limited to infection, persistent pain, nerve and vessel injury, complications associated with anesthesia, DVT, exposure to COVID virus, etc.  Patient understands the risks and complication and consented to the surgery.         Relevant Orders    Case request operating room: ARTHROSCOPY KNEE WITH ANTEROMEDIAL PLICA RESECTION (Completed)    CBC and Platelet    Basic metabolic panel    Crutches      Subjective:     Patient ID: Alisha Luna is a 34 y.o. female.  Chief Complaint:  Patient is a 33 y.o. female presenting with left knee pain.  Patient reports that in 2018 it started hurting, but there was no acute injury or fall on the knee at the time.  She says she had issues with her right knee since the early 2000s so she compensated by putting more weight on her left knee, which she believes is the cause of the pain.  She saw her PCP in 2018, who believed it was a ligament problem and sent her to PT.  She went to PT for  several months and had no improvement so she tried to get an MRI.  She reports that she wasn't approved for an MRI so she returned to PT and they started to focus on her patellar tendon, as they thought that was the problem.  She says she was also given a patellar tendon brace at that time and bought a knee brace for support.  She reports constantly needing the knee brace because of the pain and instability she feels in her knee.  She says she went to PT for 7 months in 2020 for her knee as it was worsening and had minimal improvement.  She says that between 2020 and now there has been less pain but that she recently got a new PCP who referred her to orthopedics.  Currently, she states that the pain is primarily on the anterior aspect of her left knee, around the patella.  She describes it as an aching pain that is made worse with weightbearing and twisting.  She reports no pain on the lateral, medial, or posterior aspects.  She says that sometimes the pain travels up and down her legs and along her IT band.  Patient says she works out a lot and has to use modified exercises for her knee due to the pain and instability.  She states her knee has never given out but sometimes she has to sit down because it feels like it could.  She occasionally uses OTC pain meds, ice, and epsom salt baths which she says help.  She has had no acute injuries or falls on the knee and has no prior surgeries to the knee.  Patient arthrogram MRI of the left knee on 1/2/2024 did demonstrate a moderate size anteromedial plica.  She continued to experiencing pain with her daily activities.  Patient is here to discuss surgical treatment.    Allergy:  No Known Allergies  Medications:  all current active meds have been reviewed  Past Medical History:  History reviewed. No pertinent past medical history.  Past Surgical History:  Past Surgical History:   Procedure Laterality Date    FL INJECTION LEFT KNEE (ARTHROGRAM)  1/2/2024    WISDOM TOOTH  "EXTRACTION       Family History:  History reviewed. No pertinent family history.  Social History:  Social History     Substance and Sexual Activity   Alcohol Use Not Currently     Social History     Substance and Sexual Activity   Drug Use Never     Social History     Tobacco Use   Smoking Status Never   Smokeless Tobacco Never     Review of Systems   Constitutional: Negative.    HENT: Negative.     Eyes: Negative.    Respiratory: Negative.     Cardiovascular: Negative.    Gastrointestinal: Negative.    Endocrine: Negative.    Genitourinary: Negative.    Musculoskeletal:  Positive for arthralgias (left knee) and gait problem (Antalgic).   Skin: Negative.    Allergic/Immunologic: Negative.    Hematological: Negative.    Psychiatric/Behavioral: Negative.           Objective:  BP Readings from Last 1 Encounters:   03/26/24 118/80      Wt Readings from Last 1 Encounters:   03/26/24 67.7 kg (149 lb 3.2 oz)      BMI:   Estimated body mass index is 30.13 kg/m² as calculated from the following:    Height as of this encounter: 4' 11\" (1.499 m).    Weight as of this encounter: 67.7 kg (149 lb 3.2 oz).  BSA:   Estimated body surface area is 1.63 meters squared as calculated from the following:    Height as of this encounter: 4' 11\" (1.499 m).    Weight as of this encounter: 67.7 kg (149 lb 3.2 oz).   Physical Exam  Vitals and nursing note reviewed.   Constitutional:       General: She is not in acute distress.     Appearance: Normal appearance. She is well-developed.   HENT:      Head: Normocephalic and atraumatic.      Right Ear: External ear normal.      Left Ear: External ear normal.   Eyes:      Extraocular Movements: Extraocular movements intact.      Conjunctiva/sclera: Conjunctivae normal.      Pupils: Pupils are equal, round, and reactive to light.   Cardiovascular:      Rate and Rhythm: Regular rhythm.      Heart sounds: Normal heart sounds. No murmur heard.     No gallop.   Pulmonary:      Effort: Pulmonary effort is " normal. No respiratory distress.      Breath sounds: Normal breath sounds.   Abdominal:      Palpations: Abdomen is soft.   Musculoskeletal:      Cervical back: Normal range of motion and neck supple.      Left knee: No effusion.      Instability Tests: Medial Ayana test negative and lateral Ayana test negative.   Skin:     General: Skin is warm and dry.   Neurological:      Mental Status: She is alert and oriented to person, place, and time.      Deep Tendon Reflexes: Reflexes are normal and symmetric.   Psychiatric:         Mood and Affect: Mood normal.         Behavior: Behavior normal.       Left Knee Exam     Tenderness   Left knee tenderness location: medial patella.    Range of Motion   Extension:  abnormal Left knee extension: Stiffness and pain.  Flexion:  normal     Tests   Ayana:  Medial - negative Lateral - negative  Varus: negative Valgus: negative  Lachman:  Anterior - negative    Posterior - negative  Drawer:  Anterior - negative     Posterior - negative  Patellar apprehension: negative    Other   Erythema: absent  Scars: absent  Sensation: normal  Pulse: present  Swelling: none  Effusion: no effusion present    Comments:  Patient guarding during exam            I have personally reviewed pertinent films in PACS and my interpretation is arthrogram MRI left knee show intact ACL, PCL, LCL, MCL, and meniscus.  There is a large size redundant synovium tissue along the anteromedial aspect of the knee consistent with anteromedial plica.

## 2024-03-27 ENCOUNTER — PREP FOR PROCEDURE (OUTPATIENT)
Dept: OBGYN CLINIC | Facility: CLINIC | Age: 34
End: 2024-03-27

## 2024-04-10 ENCOUNTER — APPOINTMENT (OUTPATIENT)
Dept: LAB | Facility: HOSPITAL | Age: 34
End: 2024-04-10
Payer: COMMERCIAL

## 2024-04-10 DIAGNOSIS — M67.52 SYNOVIAL PLICA SYNDROME OF LEFT KNEE: ICD-10-CM

## 2024-04-10 LAB
ANION GAP SERPL CALCULATED.3IONS-SCNC: 8 MMOL/L (ref 4–13)
BUN SERPL-MCNC: 13 MG/DL (ref 5–25)
CALCIUM SERPL-MCNC: 9.1 MG/DL (ref 8.4–10.2)
CHLORIDE SERPL-SCNC: 103 MMOL/L (ref 96–108)
CO2 SERPL-SCNC: 27 MMOL/L (ref 21–32)
CREAT SERPL-MCNC: 0.6 MG/DL (ref 0.6–1.3)
ERYTHROCYTE [DISTWIDTH] IN BLOOD BY AUTOMATED COUNT: 11.9 % (ref 11.6–15.1)
GFR SERPL CREATININE-BSD FRML MDRD: 117 ML/MIN/1.73SQ M
GLUCOSE P FAST SERPL-MCNC: 90 MG/DL (ref 65–99)
HCT VFR BLD AUTO: 42.8 % (ref 34.8–46.1)
HGB BLD-MCNC: 13.9 G/DL (ref 11.5–15.4)
MCH RBC QN AUTO: 30.2 PG (ref 26.8–34.3)
MCHC RBC AUTO-ENTMCNC: 32.5 G/DL (ref 31.4–37.4)
MCV RBC AUTO: 93 FL (ref 82–98)
PLATELET # BLD AUTO: 331 THOUSANDS/UL (ref 149–390)
PMV BLD AUTO: 10.2 FL (ref 8.9–12.7)
POTASSIUM SERPL-SCNC: 3.5 MMOL/L (ref 3.5–5.3)
RBC # BLD AUTO: 4.61 MILLION/UL (ref 3.81–5.12)
SODIUM SERPL-SCNC: 138 MMOL/L (ref 135–147)
WBC # BLD AUTO: 7.59 THOUSAND/UL (ref 4.31–10.16)

## 2024-04-10 PROCEDURE — 36415 COLL VENOUS BLD VENIPUNCTURE: CPT

## 2024-04-10 PROCEDURE — 80048 BASIC METABOLIC PNL TOTAL CA: CPT

## 2024-04-10 PROCEDURE — 85027 COMPLETE CBC AUTOMATED: CPT

## 2024-04-10 NOTE — PRE-PROCEDURE INSTRUCTIONS
Pre-Surgery Instructions:   Medication Instructions    MAGNESIUM PO Stop taking 7 days prior to surgery.    Multiple Vitamins-Minerals (MULTIVITAMIN ADULTS PO) Stop taking 7 days prior to surgery.    Omega-3 Fatty Acids (OMEGA-3 FISH OIL PO) Stop taking 7 days prior to surgery.    Pt reports has cleanser and instructions reviewed with pt multiple times - both night before and DOS - pt did verbalized understanding of instructions given.    Pt instructed no hair or body products on skin for DOS   Pt instructed no razor blade shaving within one week prior to surgery (herbie at surgical site area) - ok to use electric razor up till 4/17 - no shaving 4/18 or 4/19.    Pt instructed if with any health status changes between now and DOS - notify surgeon office  Pt is anxious- did instruct pt to speak with pre op staff and anesthesia about any possible medications for pre op DOS to help alleviate this.    Medication instructions for day surgery reviewed. Please use only a sip of water to take your instructed medications. Avoid all over the counter vitamins, supplements and NSAIDS for one week prior to surgery per anesthesia guidelines. Tylenol is ok to take as needed.     You will receive a call one business day prior to surgery with an arrival time and hospital directions. If your surgery is scheduled on a Monday, the hospital will be calling you on the Friday prior to your surgery. If you have not heard from anyone by 8pm, please call the hospital supervisor through the hospital  at [phone]. ([place] [phone] or [place] [phone]).    Do not eat or drink anything after midnight the night before your surgery, including candy, mints, lifesavers, or chewing gum. Do not drink alcohol 24hrs before your surgery. Try not to smoke at least 24hrs before your surgery.       Follow the pre surgery showering instructions as listed in the “My Surgical Experience Booklet” or otherwise provided by your surgeon's  office. Do not use a blade to shave the surgical area 1 week before surgery. It is okay to use a clean electric clippers up to 24 hours before surgery. Do not apply any lotions, creams, including makeup, cologne, deodorant, or perfumes after showering on the day of your surgery. Do not use dry shampoo, hair spray, hair gel, or any type of hair products.     No contact lenses, eye make-up, or artificial eyelashes. Remove nail polish, including gel polish, and any artificial, gel, or acrylic nails if possible. Remove all jewelry including rings and body piercing jewelry.     Wear causal clothing that is easy to take on and off. Consider your type of surgery.    Keep any valuables, jewelry, piercings at home. Please bring any specially ordered equipment (sling, braces) if indicated.    Arrange for a responsible person to drive you to and from the hospital on the day of your surgery. Please confirm the visitor policy for the day of your procedure when you receive your phone call with an arrival time.     Call the surgeon's office with any new illnesses, exposures, or additional questions prior to surgery.    Please reference your “My Surgical Experience Booklet” for additional information to prepare for your upcoming surgery.

## 2024-04-18 ENCOUNTER — ANESTHESIA EVENT (OUTPATIENT)
Dept: PERIOP | Facility: HOSPITAL | Age: 34
End: 2024-04-18
Payer: COMMERCIAL

## 2024-04-19 ENCOUNTER — ANESTHESIA (OUTPATIENT)
Dept: PERIOP | Facility: HOSPITAL | Age: 34
End: 2024-04-19
Payer: COMMERCIAL

## 2024-04-19 ENCOUNTER — HOSPITAL ENCOUNTER (OUTPATIENT)
Facility: HOSPITAL | Age: 34
Setting detail: OUTPATIENT SURGERY
Discharge: HOME | End: 2024-04-19
Attending: ORTHOPAEDIC SURGERY | Admitting: ORTHOPAEDIC SURGERY
Payer: COMMERCIAL

## 2024-04-19 VITALS
HEART RATE: 116 BPM | SYSTOLIC BLOOD PRESSURE: 113 MMHG | TEMPERATURE: 98.4 F | BODY MASS INDEX: 29.76 KG/M2 | OXYGEN SATURATION: 97 % | WEIGHT: 147.6 LBS | RESPIRATION RATE: 23 BRPM | DIASTOLIC BLOOD PRESSURE: 70 MMHG | HEIGHT: 59 IN

## 2024-04-19 DIAGNOSIS — M67.52 SYNOVIAL PLICA SYNDROME OF LEFT KNEE: Primary | ICD-10-CM

## 2024-04-19 LAB
EXT PREGNANCY TEST URINE: NEGATIVE
EXT. CONTROL: NORMAL

## 2024-04-19 PROCEDURE — 81025 URINE PREGNANCY TEST: CPT | Performed by: ORTHOPAEDIC SURGERY

## 2024-04-19 PROCEDURE — 29875 ARTHRS KNEE SURG SYNVCT LMTD: CPT | Performed by: PHYSICIAN ASSISTANT

## 2024-04-19 PROCEDURE — 29875 ARTHRS KNEE SURG SYNVCT LMTD: CPT | Performed by: ORTHOPAEDIC SURGERY

## 2024-04-19 RX ORDER — BUPIVACAINE HYDROCHLORIDE AND EPINEPHRINE 2.5; 5 MG/ML; UG/ML
INJECTION, SOLUTION EPIDURAL; INFILTRATION; INTRACAUDAL; PERINEURAL AS NEEDED
Status: DISCONTINUED | OUTPATIENT
Start: 2024-04-19 | End: 2024-04-19 | Stop reason: HOSPADM

## 2024-04-19 RX ORDER — LIDOCAINE HYDROCHLORIDE 10 MG/ML
INJECTION, SOLUTION EPIDURAL; INFILTRATION; INTRACAUDAL; PERINEURAL AS NEEDED
Status: DISCONTINUED | OUTPATIENT
Start: 2024-04-19 | End: 2024-04-19

## 2024-04-19 RX ORDER — ONDANSETRON 2 MG/ML
INJECTION INTRAMUSCULAR; INTRAVENOUS AS NEEDED
Status: DISCONTINUED | OUTPATIENT
Start: 2024-04-19 | End: 2024-04-19

## 2024-04-19 RX ORDER — FENTANYL CITRATE 50 UG/ML
INJECTION, SOLUTION INTRAMUSCULAR; INTRAVENOUS AS NEEDED
Status: DISCONTINUED | OUTPATIENT
Start: 2024-04-19 | End: 2024-04-19

## 2024-04-19 RX ORDER — OXYCODONE HYDROCHLORIDE AND ACETAMINOPHEN 5; 325 MG/1; MG/1
1 TABLET ORAL EVERY 4 HOURS PRN
Status: DISCONTINUED | OUTPATIENT
Start: 2024-04-19 | End: 2024-04-19 | Stop reason: HOSPADM

## 2024-04-19 RX ORDER — CEFAZOLIN SODIUM 1 G/50ML
SOLUTION INTRAVENOUS AS NEEDED
Status: DISCONTINUED | OUTPATIENT
Start: 2024-04-19 | End: 2024-04-19

## 2024-04-19 RX ORDER — PROPOFOL 10 MG/ML
INJECTION, EMULSION INTRAVENOUS AS NEEDED
Status: DISCONTINUED | OUTPATIENT
Start: 2024-04-19 | End: 2024-04-19

## 2024-04-19 RX ORDER — SODIUM CHLORIDE, SODIUM LACTATE, POTASSIUM CHLORIDE, CALCIUM CHLORIDE 600; 310; 30; 20 MG/100ML; MG/100ML; MG/100ML; MG/100ML
125 INJECTION, SOLUTION INTRAVENOUS CONTINUOUS
Status: DISCONTINUED | OUTPATIENT
Start: 2024-04-19 | End: 2024-04-19 | Stop reason: HOSPADM

## 2024-04-19 RX ORDER — FENTANYL CITRATE/PF 50 MCG/ML
25 SYRINGE (ML) INJECTION
Status: DISCONTINUED | OUTPATIENT
Start: 2024-04-19 | End: 2024-04-19 | Stop reason: HOSPADM

## 2024-04-19 RX ORDER — OXYCODONE HYDROCHLORIDE AND ACETAMINOPHEN 5; 325 MG/1; MG/1
1 TABLET ORAL EVERY 4 HOURS PRN
Qty: 15 TABLET | Refills: 0 | Status: SHIPPED | OUTPATIENT
Start: 2024-04-19 | End: 2024-04-29

## 2024-04-19 RX ORDER — KETOROLAC TROMETHAMINE 30 MG/ML
INJECTION, SOLUTION INTRAMUSCULAR; INTRAVENOUS AS NEEDED
Status: DISCONTINUED | OUTPATIENT
Start: 2024-04-19 | End: 2024-04-19

## 2024-04-19 RX ORDER — ACETAMINOPHEN 325 MG/1
650 TABLET ORAL EVERY 6 HOURS PRN
Status: CANCELLED | OUTPATIENT
Start: 2024-04-19

## 2024-04-19 RX ORDER — ONDANSETRON 2 MG/ML
4 INJECTION INTRAMUSCULAR; INTRAVENOUS EVERY 6 HOURS PRN
Status: CANCELLED | OUTPATIENT
Start: 2024-04-19

## 2024-04-19 RX ORDER — CHLORHEXIDINE GLUCONATE ORAL RINSE 1.2 MG/ML
15 SOLUTION DENTAL ONCE
Status: DISCONTINUED | OUTPATIENT
Start: 2024-04-19 | End: 2024-04-19 | Stop reason: HOSPADM

## 2024-04-19 RX ORDER — SODIUM CHLORIDE, SODIUM LACTATE, POTASSIUM CHLORIDE, CALCIUM CHLORIDE 600; 310; 30; 20 MG/100ML; MG/100ML; MG/100ML; MG/100ML
INJECTION, SOLUTION INTRAVENOUS CONTINUOUS PRN
Status: DISCONTINUED | OUTPATIENT
Start: 2024-04-19 | End: 2024-04-19

## 2024-04-19 RX ORDER — CEFAZOLIN SODIUM 1 G/50ML
1000 SOLUTION INTRAVENOUS ONCE
Status: DISCONTINUED | OUTPATIENT
Start: 2024-04-19 | End: 2024-04-19 | Stop reason: HOSPADM

## 2024-04-19 RX ORDER — DEXAMETHASONE SODIUM PHOSPHATE 10 MG/ML
INJECTION, SOLUTION INTRAMUSCULAR; INTRAVENOUS AS NEEDED
Status: DISCONTINUED | OUTPATIENT
Start: 2024-04-19 | End: 2024-04-19

## 2024-04-19 RX ORDER — MIDAZOLAM HYDROCHLORIDE 2 MG/2ML
INJECTION, SOLUTION INTRAMUSCULAR; INTRAVENOUS AS NEEDED
Status: DISCONTINUED | OUTPATIENT
Start: 2024-04-19 | End: 2024-04-19

## 2024-04-19 RX ADMIN — FENTANYL CITRATE 25 MCG: 50 INJECTION, SOLUTION INTRAMUSCULAR; INTRAVENOUS at 08:04

## 2024-04-19 RX ADMIN — KETOROLAC TROMETHAMINE 30 MG: 30 INJECTION, SOLUTION INTRAMUSCULAR; INTRAVENOUS at 08:00

## 2024-04-19 RX ADMIN — PROPOFOL 200 MG: 10 INJECTION, EMULSION INTRAVENOUS at 07:29

## 2024-04-19 RX ADMIN — SODIUM CHLORIDE, SODIUM LACTATE, POTASSIUM CHLORIDE, AND CALCIUM CHLORIDE: .6; .31; .03; .02 INJECTION, SOLUTION INTRAVENOUS at 07:26

## 2024-04-19 RX ADMIN — CEFAZOLIN SODIUM 1000 MG: 1 SOLUTION INTRAVENOUS at 07:34

## 2024-04-19 RX ADMIN — FENTANYL CITRATE 50 MCG: 50 INJECTION, SOLUTION INTRAMUSCULAR; INTRAVENOUS at 07:29

## 2024-04-19 RX ADMIN — ONDANSETRON 4 MG: 2 INJECTION INTRAMUSCULAR; INTRAVENOUS at 07:59

## 2024-04-19 RX ADMIN — MIDAZOLAM 2 MG: 1 INJECTION INTRAMUSCULAR; INTRAVENOUS at 07:24

## 2024-04-19 RX ADMIN — LIDOCAINE HYDROCHLORIDE 50 MG: 10 INJECTION, SOLUTION EPIDURAL; INFILTRATION; INTRACAUDAL; PERINEURAL at 07:29

## 2024-04-19 RX ADMIN — DEXAMETHASONE SODIUM PHOSPHATE 10 MG: 10 INJECTION, SOLUTION INTRAMUSCULAR; INTRAVENOUS at 07:35

## 2024-04-19 RX ADMIN — FENTANYL CITRATE 25 MCG: 50 INJECTION, SOLUTION INTRAMUSCULAR; INTRAVENOUS at 07:47

## 2024-04-19 NOTE — DISCHARGE INSTR - AVS FIRST PAGE
The principal surgical findings in your knee were:    1. Left knee anteromedial plica       The following corrective procedures were performed:     1. Arthroscopic anteromedial plica resection      FOLLOW-UP:     You will need an appt. in: As scheduled   Please call the office at [phone].     GENERAL INSTRUCTIONS:     Apply an ice pack to your knee for the next 12-24 hours.    If crutches were prescribed, you should limit weight bearing at all times as instructed. Keep knee stiff the first day, avoid too much bending.    Take it easy for at least 24 hours. Do not drive for 3-5 days. You may move about, but stay around home or hotel.   If you have an upset stomach, take only cool, clear liquids, such as Gatorade, Jello, or Ginger ale. If nausea persists for more than 25 hours, notify my office.    Low grade temperature is not uncommon after surgery. However, if your temperature exceeds 101 degrees, please notify my office.    The Novocain in your knee will keep your knee numb until tonight. When the medicine wears off, you will feel pain for several days to one week. This is normal after arthroscopic surgery.   On the day after surgery, you may walk normally and bend the knee normally.          You may continue using a cane or crutches to minimize any discomfort the first 24 to 48 hours.    It is normal to have swelling and discomfort in the knee for several days to one week after arthroscopic surgery.    You should take on 81 mg enteric-coated aspirin in the morning and one tablet at night to help minimize the chance of developing phlebitis (clots in the vein). This should also be taken with food or a glass of milk to avoid stomach upset. Examples of enteric-coated aspirin are Ecotrin, Ascriptin, Or Bufferin. DO NOT TAKE this if you are known to be allergic to it or have a prior history of stomach ulcer disease.  NOTE:  If, after taking the enteric coated aspirin, you develop any pain in the stomach, nausea,  vomiting, or stomach irritation, you should stop the medication immediately because it may cause stomach ulcers. Also, if you continue taking this medication while you are having pain in the stomach or nausea or stomach cramps, an ulcer could develop.         To reduce pain and swelling, place several pillows under your knee for the first 24 to 48 hours. The knee should be elevated above the heart. Ice should be applied to the knee during this period and this will help decrease discomfort and swelling. Apply to the knee for 30 minutes every 2 hours.         Any prescription you received before surgery or after surgery should be filled immediately, and taken according to directions on the label. Taking the medicine with food or with a glass of milk will avoid stomach upset.         Weight bearing as per instructions from the surgeon.    EXERCISES:      Begin doing gentle exercises right away. Exercising will reduce the swelling, increase motion, and prevent muscle weakness. The following exercises should be performed during the first week and a half, following surgery.   The advanced knee exercise program will be started when you are seen in the office.       1. QUAD SETS: Straighten as straight as possible and then clench the thigh muscles tightly. Keep the muscles clenched tightly to the slow count of 3 then relax. Repeat this exercise 10-30 times every hour.    2. STRAIGHT LEG RAISING: With the knee held straight and the quadriceps muscle contracted, raise your leg up 6 to 8 inches and hold it to the slow count of 3. Repeat this exercise 10-30 times every hour.    3. RANGE OF MOTION: You should start bending your knee to the point of pain and increase bending it until full motion is obtained. Repeat this exercise 10-30 times every hour.        For the first 48 hours inhale deeply and hold your breath for 3 seconds; exhale completely. Repeat 10 times, 4 times daily.    If you smoke, avoid cigarettes for 48 hours.      BANDAGES:      Your bandage may show blood stains within 1-12 hours. This is motly fluid that was used to irrigate your knee, slightly tinged with blood. It is no cause for concern. However, if your bandage becomes saturated, notify my office right away.         You may remove the bulky dressings in 2 days and applied band aids to the small skin incisions. You may shower in 3 days after surgery.    WORK:   Plan to take 3 or 4 days off from work. You can resume work when you are comfortable. (This can be a week or more depending on the type of work you do). Do not walk or stand for excessive periods. Do not operate heavy machinery that requires pedals.

## 2024-04-19 NOTE — ANESTHESIA POSTPROCEDURE EVALUATION
Post-Op Assessment Note    CV Status:  Stable    Pain management: adequate       Mental Status:  Sleepy   Hydration Status:  Euvolemic   PONV Controlled:  Controlled   Airway Patency:  Patent     Post Op Vitals Reviewed: Yes    No anethesia notable event occurred.    Staff: Anesthesiologist, CRNA               BP   100/57   Temp      Pulse  102   Resp   15   SpO2   97

## 2024-04-19 NOTE — ANESTHESIA PREPROCEDURE EVALUATION
Procedure:  ARTHROSCOPY KNEE WITH ANTEROMEDIAL PLICA RESECTION (Left: Knee)    Relevant Problems   No relevant active problems      Hx anxiety/palpitations    Physical Exam    Airway    Mallampati score: III  TM Distance: >3 FB  Neck ROM: full     Dental   No notable dental hx     Cardiovascular      Pulmonary      Other Findings  post-pubertal.      Anesthesia Plan  ASA Score- 2     Anesthesia Type- general with ASA Monitors.         Additional Monitors:     Airway Plan: LMA.    Comment: GA with LMA, IV, antiemetics.       Plan Factors-    Chart reviewed.    Patient summary reviewed.    Patient is not a current smoker.              Induction- intravenous.    Postoperative Plan- . Planned trial extubation    Informed Consent- Anesthetic plan and risks discussed with patient.  I personally reviewed this patient with the CRNA. Discussed and agreed on the Anesthesia Plan with the CRNA..

## 2024-04-19 NOTE — INTERVAL H&P NOTE
H&P reviewed. After examining the patient I find no changes in the patients condition since the H&P had been written.    Vitals:    04/19/24 0629   BP: 137/80   Pulse: (!) 125   Resp: 18   Temp: 97.6 °F (36.4 °C)   SpO2: 100%

## 2024-04-19 NOTE — OP NOTE
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 years old female diagnosed with left knee anteromedial plica. Patient failed conservative treatments and elected to proceed with surgical intervention. The risks and complications are discussed with the patient. The patient consented to the procedure.    Procedure: Patient was brought into the OR and placed in supine position. Patient was anesthetized and intubated with LMA without any complications. Patient's left knee was prep and draped in sterile fashion with tourniquet in the upper thigh. A time out was call and identified the left knee was the operating site. 2 portals were utilized for instrumentation.  Each portal was injected with 1-2 cc of Marcaine 0.5% with epinephrine.  60 cc saline was injected into the joint through and superolateral aspect of the knee into the suprapatealla. The scope was introduced into the knee from the lateral portal. Inspection of the knee was carried out in counter clockwise fashion. A medial protal was also created for instrumentation. Patellofemoral joint showed grade 0 degenerative changes. Anterior medial plica was present and thicken. Medial compartment showed grade 0 degenerative changes. Medial meniscus was intact. ACL and PCL were intact. Lateral  compartment showed grade 0 degenerative changes. Lateral meniscus was intact.    Anteromedial plica was resected using mechanical shaver to carry out the procedure. Excess fluid was drain out of the knee 25 cc of 0.5% Marcaine with epinephrine was injected into the knee joint for post-op pain control. All counts were correct.    The incisions were closed with Nylon. A sterile bulky dressing was applied. The patient tolerated the procedure well without any complications. Patient was then extubated and transferred to recovery room for post-op care. The family was contacted.    There was no qualified resident available to assist.    Mrs. Oakley was required in the OR in helping performing the minimal invasive arthroscopic techniques in meniscectomy by manipulating the knee and clearing the surgical field for better visualization, as well as assisting in utilizing the shaver, control the camera, and biter.    Complications:   None    Patient Disposition:  PACU         SIGNATURE: Nat Martinez MD  DATE: April 19, 2024  TIME: 8:06 AM

## 2024-04-23 ENCOUNTER — TELEPHONE (OUTPATIENT)
Age: 34
End: 2024-04-23

## 2024-04-23 NOTE — TELEPHONE ENCOUNTER
Caller: Patient     Doctor: Michelle     Reason for call: Patient is s/p ARTHROSCOPY KNEE WITH ANTEROMEDIAL PLICA RESECTION (Left: Knee), sx 4/19/24. She is asking how long she needs to take the ASA for PO. She states that her heart is racing ever since she started. She is only taking Tylenol along with it. Please advise     Call back#: [phone]    *Tiger Text sent to on call doctor*

## 2024-04-24 NOTE — TELEPHONE ENCOUNTER
Caller: Patient     Doctor: Michelle    Reason for call: Requesting to speak w/Ortho nurse. Please cb    Call back#: [phone]

## 2024-04-24 NOTE — TELEPHONE ENCOUNTER
Called and spoke w/pt for update.  She received a call last night and was told to take 1 tablet ASA a day and can wear a compression stocking. Also was told she could've been a little dehydrated after surgery when she took the ASA.  She is going out today to get a brand name ASA instead of the [facility] brand she has.  She will make sure she is hydrated and take at lunchtime and let us know how she does.    She would also like to know if she can do PT in addition to home exercises?      States she is doing well.  Pain level is low.     Call pt back after lunch w/any responses.

## 2024-04-24 NOTE — TELEPHONE ENCOUNTER
Called pt back and she was able to take ASA and did not have any heart rate increases or reactions. She did call gym and did call they have stationary bike w/out tension.  Should she use the upright bike or the recumbent bike?  Please review advise. Can leave VM.

## 2024-04-24 NOTE — TELEPHONE ENCOUNTER
Called and spoke w/pt and relayed Dr Martinez's msg.  She states that she was feeling a little weak today.  She drank her vitamin water and felt better. She did go back to work yesterday.  Advised to make position changes slowly and not to overdo activities. Make sure eating and drinking. She has not take ASA again yet.  Is just now starting to eat lunch. If she has a problem, will take 1 ibuprofen 200mg daily for 4 weeks.  She will go to gym and ride stationary bike w/no tension.  CB w/any further issues/concerns/questions.

## 2024-05-02 ENCOUNTER — OFFICE VISIT (OUTPATIENT)
Dept: OBGYN CLINIC | Facility: CLINIC | Age: 34
End: 2024-05-02

## 2024-05-02 VITALS
SYSTOLIC BLOOD PRESSURE: 112 MMHG | BODY MASS INDEX: 30.04 KG/M2 | WEIGHT: 149 LBS | HEIGHT: 59 IN | DIASTOLIC BLOOD PRESSURE: 68 MMHG

## 2024-05-02 DIAGNOSIS — Z47.89 AFTERCARE FOLLOWING SURGERY OF THE MUSCULOSKELETAL SYSTEM: Primary | ICD-10-CM

## 2024-05-02 PROCEDURE — 99024 POSTOP FOLLOW-UP VISIT: CPT | Performed by: ORTHOPAEDIC SURGERY

## 2024-05-02 NOTE — PROGRESS NOTES
Assessment:     1. Aftercare following surgery of the musculoskeletal system        Plan:     Problem List Items Addressed This Visit          Orthopedic/Musculoskeletal    Aftercare following surgery of the musculoskeletal system - Primary     Findings consistent with status post left knee arthroscopy and resection of anteromedial plica.  Discussed findings and treatment options with the patient.  Patient is doing well.  I advised patient to continue low impact exercises with stationary bike.  Weight-bear as tolerated.  Provide patient a prescription for attending physical therapy.  Follow-up in 4 weeks.  All patient's questions were answered to her satisfaction.  This note is created using dictation transcription.  It may contain typographical errors, grammatical errors, improperly dictated words, background noise and other errors.         Relevant Orders    Ambulatory Referral to Physical Therapy      Subjective:     Patient ID: Alisha Luna is a 34 y.o. female.  Chief Complaint:  34-year-old female status post left knee arthroscopy and resection of anteromedial plica on 4/19/2024.  Patient is doing well.  She is ambulating without use of any assist device.  She denies fever, or chill, sweat.  Her presurgical pain has resolved.  She has been doing exercise in the gym with a stepper machine.  She has some difficulty with stationary bike due to her height.      Allergy:  No Known Allergies  Medications:  all current active meds have been reviewed  Past Medical History:  Past Medical History:   Diagnosis Date    Anxiety     dx in 2023    History of palpitations     takes magnesium for this    Knee pain, left     Mild pain per pt     Past Surgical History:  Past Surgical History:   Procedure Laterality Date    EGD      FL INJECTION LEFT KNEE (ARTHROGRAM)  01/02/2024    MULTIPLE TOOTH EXTRACTIONS      AZ ARTHROSCOPY KNEE DIAGNOSTIC W/WO SYNOVIAL BX SPX Left 4/19/2024    Procedure: ARTHROSCOPY KNEE WITH  "ANTEROMEDIAL PLICA RESECTION;  Surgeon: Nat Martinez MD;  Location:  MAIN OR;  Service: Orthopedics    WISDOM TOOTH EXTRACTION       Family History:  Family History   Problem Relation Age of Onset    Anesthesia problems Neg Hx      Social History:  Social History     Substance and Sexual Activity   Alcohol Use Yes    Alcohol/week: 1.0 standard drink of alcohol    Types: 1 Glasses of wine per week    Comment: very occasionally per pt     Social History     Substance and Sexual Activity   Drug Use Never    Comment: Denies any drug use per pt     Social History     Tobacco Use   Smoking Status Never   Smokeless Tobacco Never   Tobacco Comments    Never a smoker or use of any tobacco products per pt      Review of Systems   Constitutional: Negative.    HENT: Negative.     Eyes: Negative.    Respiratory: Negative.     Cardiovascular: Negative.    Gastrointestinal: Negative.    Endocrine: Negative.    Genitourinary: Negative.    Musculoskeletal:  Positive for arthralgias (Mild left knee), gait problem (Slight limp) and joint swelling (Left knee).   Skin: Negative.    Allergic/Immunologic: Negative.    Hematological: Negative.    Psychiatric/Behavioral: Negative.           Objective:  BP Readings from Last 1 Encounters:   05/02/24 112/68      Wt Readings from Last 1 Encounters:   05/02/24 67.6 kg (149 lb)      BMI:   Estimated body mass index is 30.09 kg/m² as calculated from the following:    Height as of this encounter: 4' 11\" (1.499 m).    Weight as of this encounter: 67.6 kg (149 lb).  BSA:   Estimated body surface area is 1.63 meters squared as calculated from the following:    Height as of this encounter: 4' 11\" (1.499 m).    Weight as of this encounter: 67.6 kg (149 lb).   Physical Exam  Vitals and nursing note reviewed.   Constitutional:       Appearance: Normal appearance. She is well-developed.   HENT:      Head: Normocephalic and atraumatic.      Right Ear: External ear normal.      Left Ear: External ear " normal.   Eyes:      Extraocular Movements: Extraocular movements intact.      Conjunctiva/sclera: Conjunctivae normal.   Pulmonary:      Effort: Pulmonary effort is normal.   Musculoskeletal:      Cervical back: Neck supple.      Left knee: No effusion.   Skin:     General: Skin is warm and dry.   Neurological:      Mental Status: She is alert and oriented to person, place, and time.      Deep Tendon Reflexes: Reflexes are normal and symmetric.   Psychiatric:         Mood and Affect: Mood normal.         Behavior: Behavior normal.       Left Knee Exam     Tenderness   Left knee tenderness location: Anterior.    Range of Motion   The patient has normal left knee ROM.    Tests   Varus: negative Valgus: negative  Patellar apprehension: negative    Other   Erythema: absent  Scars: present (Incisions are healing well.  No signs of infection)  Sensation: normal  Pulse: present  Swelling: mild  Effusion: no effusion present            No new images were reviewed today    Sutures removed

## 2024-05-02 NOTE — ASSESSMENT & PLAN NOTE
Findings consistent with status post left knee arthroscopy and resection of anteromedial plica.  Discussed findings and treatment options with the patient.  Patient is doing well.  I advised patient to continue low impact exercises with stationary bike.  Weight-bear as tolerated.  Provide patient a prescription for attending physical therapy.  Follow-up in 4 weeks.  All patient's questions were answered to her satisfaction.  This note is created using dictation transcription.  It may contain typographical errors, grammatical errors, improperly dictated words, background noise and other errors.

## 2024-05-09 NOTE — TELEPHONE ENCOUNTER
Caller: patient     Doctor: Michelle     Reason for call: when can patient resume getting her legs waxed?    Call back#: [phone]

## 2024-05-09 NOTE — TELEPHONE ENCOUNTER
She can, but they should avoid wax directly over incisions until they are completely healed and scabbing is gone.

## 2024-05-16 ENCOUNTER — EVALUATION (OUTPATIENT)
Dept: PHYSICAL THERAPY | Facility: CLINIC | Age: 34
End: 2024-05-16
Payer: COMMERCIAL

## 2024-05-16 DIAGNOSIS — G89.29 CHRONIC PAIN OF LEFT KNEE: ICD-10-CM

## 2024-05-16 DIAGNOSIS — M25.562 CHRONIC PAIN OF LEFT KNEE: ICD-10-CM

## 2024-05-16 DIAGNOSIS — Z47.89 AFTERCARE FOLLOWING SURGERY OF THE MUSCULOSKELETAL SYSTEM: Primary | ICD-10-CM

## 2024-05-16 PROCEDURE — 97162 PT EVAL MOD COMPLEX 30 MIN: CPT | Performed by: PHYSICAL THERAPIST

## 2024-05-16 PROCEDURE — 97110 THERAPEUTIC EXERCISES: CPT | Performed by: PHYSICAL THERAPIST

## 2024-05-16 NOTE — PROGRESS NOTES
PT Evaluation     Today's date: 2024  Patient name: Alisha Luna  : 1990  MRN: [id number]  Referring provider: Nat Martinez MD  Dx:   Encounter Diagnosis     ICD-10-CM    1. Aftercare following surgery of the musculoskeletal system  Z47.89 Ambulatory Referral to Physical Therapy      2. Chronic pain of left knee  M25.562     G89.29                      Assessment  Impairments: abnormal gait, abnormal or restricted ROM, activity intolerance, impaired physical strength, lacks appropriate home exercise program, pain with function and weight-bearing intolerance    Assessment details: Pt is a 34 y.o. year old female coming to outpatient PT s/p L knee arthroscopic plica removal on 24. Pt presents with increased pain, decreased flexion ROM, decreased strength, and overall decreased functional mobility. Pt would benefit from skilled PT services in order to address these deficits and reach maximum level of function. Thank you kindly for the referral!    Pt was issued a written HEP to be performed on a daily basis.   Pt is able to attend skilled PT 1 time per week 2* work schedule.  Barriers to intervention: transportation  Understanding of Dx/Px/POC: good     Prognosis: good    Goals  STG's ( 3-4 weeks)  1. Pt will be independent in HEP  2. Pt will have less pain walking up and down inclines at work  LTG's ( 6- 8 weeks)  1. Improve FOTO score by 6-8 points  2. Pt will have decreased pain to 2/10 at worst  3. Pt will be able to go up and down the steps reciprocally with less pain  4. Pt will have improved standing tolerance for work activities  5. Pt will return to riding a bike at the gym or over level surfaces recreationally    Plan  Patient would benefit from: PT eval and skilled physical therapy  Planned modality interventions: cryotherapy    Planned therapy interventions: joint mobilization, manual therapy, neuromuscular re-education, stretching, strengthening, therapeutic activities, therapeutic  "exercise, flexibility, functional ROM exercises and home exercise program    Frequency: 1x week  Duration in weeks: 8  Plan of Care beginning date: 2024  Plan of Care expiration date: 2024  Treatment plan discussed with: PTA and patient        Subjective Evaluation    History of Present Illness  Date of surgery: 2024  Mechanism of injury: surgery  Mechanism of injury: Pt had increased L knee pain onset . Pt had increased L knee pain after exercising at the gym. Pt went to her PCP and she did PT for her knee, with gradual worsening. Pt went to ortho in [place] and was diagnosed with PFPS. Pt returned to therapy with a modified program and her knee pain started to improve for 7 months. Pt continued to have knee weakness.     MRI testing showed \"There is a moderate sized medial patellar plica (series 2 images 14-17.)     There is also an incomplete superior patellar plica (series 6 images /.)\"    Pt had plical removal surgery on 24. Pt reports she is still limping when she is walking. Pt notices increased pain with damp and rainy weather. Pt has some difficulty going up and down the steps and has difficulty descending the steps reciprocally. Pt has tried to do the bike at the gym. Pt is able stand to prepare a meal. Pt is sleeping well. Pt is not able to squat at this time. Pt has difficulty walking long distances and going down inclines at [employer] where she works. Pt sits on a stool as needed at work.    Work; works in [employer] department at [employer]  Hobbies; exercise, hiking  Gait: mild antalgic gait, decreased L knee flexion  Patient Goals  Patient goals for therapy: decreased pain  Patient goal: to return to riding a bike; to be able to go up and down the steps and inclines  Pain  At best pain ratin  At worst pain ratin  Location: L knee  Quality: dull ache and tight    Social Support  Steps to enter house: yes (3)  Stairs in house: yes (1)   Lives in: one-story " house    Employment status: working  Treatments  Previous treatment: physical therapy (prior to surgery)        Objective     Neurological Testing     Sensation     Knee   Left Knee   Intact: Light touch    Right Knee   Intact: light touch     Reflexes   Left   Achilles (S1): normal (2+)    Right   Patellar (L4): normal (2+)  Achilles (S1): normal (2+)    Comments   Left Patellar (L4): NT 2* surgery    Active Range of Motion   Left Knee   Flexion: 84 degrees with pain  Extension: 0 degrees     Right Knee   Flexion: 143 degrees   Extension: 0 degrees     Additional Active Range of Motion Details  No TTP L knee  Mild edema surrounding surgical portholes  HS flexibility; WFL's    Passive Range of Motion   Left Knee   Flexion: 84 degrees   Extension: 0 degrees     Right Knee   Normal passive range of motion    Strength/Myotome Testing     Left Hip   Planes of Motion   Flexion: 4  Extension: 4  Abduction: 4+  Adduction: 3  External rotation: 3  Internal rotation: 3    Right Hip   Normal muscle strength    Left Knee   Flexion: 4  Extension: 3  Quadriceps contraction: fair    Right Knee   Normal strength    Additional Strength Details  Ankle df MMT: 5/5           Dx: s/p L knee arthroscopic synovial plica syndrome  EPOC: 7/11/24  CO-MORBIDITIES:  PERSONAL FACTORS:   Precautions: none      Manuals 5/16            L knee manual stretching/ MFR             K tape for support- prn                                       Neuro Re-Ed             SLS             Briges w/ pball under legs             Mini squats             Sidestepping on foam beam             Tandem walking on foam beam                                       Ther Ex             Bike for knee ROM             HS stretch             Stand HR slt bd             Gastroc stretch on slt bd             Quad sets             Supine SLR             S/l hip abduction             LAQ             Stand march             Stand HS curl             Leg press- gentle              HEP instruct 8'            Ther Activity             Step up FW             Step up lat             Gait Training                                       Modalities             Cp post tx

## 2024-05-23 ENCOUNTER — APPOINTMENT (OUTPATIENT)
Dept: PHYSICAL THERAPY | Facility: CLINIC | Age: 34
End: 2024-05-23
Payer: COMMERCIAL

## 2024-05-23 ENCOUNTER — OFFICE VISIT (OUTPATIENT)
Dept: PHYSICAL THERAPY | Facility: CLINIC | Age: 34
End: 2024-05-23
Payer: COMMERCIAL

## 2024-05-23 DIAGNOSIS — Z47.89 AFTERCARE FOLLOWING SURGERY OF THE MUSCULOSKELETAL SYSTEM: Primary | ICD-10-CM

## 2024-05-23 DIAGNOSIS — M25.562 CHRONIC PAIN OF LEFT KNEE: ICD-10-CM

## 2024-05-23 DIAGNOSIS — G89.29 CHRONIC PAIN OF LEFT KNEE: ICD-10-CM

## 2024-05-23 PROCEDURE — 97110 THERAPEUTIC EXERCISES: CPT

## 2024-05-23 PROCEDURE — 97112 NEUROMUSCULAR REEDUCATION: CPT

## 2024-05-23 NOTE — PROGRESS NOTES
"Daily Note     Today's date: 2024  Patient name: Alisha Luna  : 1990  MRN: [id number]  Referring provider: Nat Martinez MD  Dx:   Encounter Diagnosis     ICD-10-CM    1. Aftercare following surgery of the musculoskeletal system  Z47.89       2. Chronic pain of left knee  M25.562     G89.29                      Subjective: Pt states feeling a bit stiff/sore.      Objective: See treatment diary below  FOTO given (___) and Re-eval Schedule (___)      Assessment: Pt performed below TE with good tolerance and technique, no complain of pain or discomfort, however noted some hesitant with knee flexion.       Plan: Continue per plan of care.      Dx: s/p L knee arthroscopic synovial plica syndrome  EPOC: 24  CO-MORBIDITIES:  PERSONAL FACTORS:   Precautions: none    FOTO Score  Score Predication:  Date:    Score:  Date:    Score:  Date:    Score:    Manuals            L knee manual stretching/ MFR  8'           K tape for support- prn                                       Neuro Re-Ed             SLS             Briges w/ pball under legs  NO PBALL 20           Mini squats             Sidestepping on foam beam             Tandem walking on foam beam                                       Ther Ex             Bike for knee ROM  6' half rot           HS stretch supine +Strap  10\" 5           Supine strap under thigh, Over Pressure knee flexion  10\" 5           Prone knee flexion +Strap  10\" 5           Stand HR slt bd             Gastroc stretch on slt bd             Quad sets             Supine SLR  20x           Prone SLR   20x           Prone knee flexion + ext  20x           S/l hip abduction             LAQ  5\" 20           SAQ  5\" 20           Stand march             Stand HS curl             Leg press- gentle  105# 20           HEP instruct 8'            Ther Activity             Step up FW             Step up lat             Gait Training                                       Modalities  "            Cp post tx

## 2024-05-30 ENCOUNTER — OFFICE VISIT (OUTPATIENT)
Dept: OBGYN CLINIC | Facility: CLINIC | Age: 34
End: 2024-05-30

## 2024-05-30 ENCOUNTER — OFFICE VISIT (OUTPATIENT)
Dept: PHYSICAL THERAPY | Facility: CLINIC | Age: 34
End: 2024-05-30
Payer: COMMERCIAL

## 2024-05-30 VITALS
BODY MASS INDEX: 30.44 KG/M2 | SYSTOLIC BLOOD PRESSURE: 116 MMHG | HEIGHT: 59 IN | WEIGHT: 151 LBS | DIASTOLIC BLOOD PRESSURE: 72 MMHG

## 2024-05-30 DIAGNOSIS — Z47.89 AFTERCARE FOLLOWING SURGERY OF THE MUSCULOSKELETAL SYSTEM: Primary | ICD-10-CM

## 2024-05-30 DIAGNOSIS — G89.29 CHRONIC PAIN OF LEFT KNEE: ICD-10-CM

## 2024-05-30 DIAGNOSIS — M25.562 CHRONIC PAIN OF LEFT KNEE: ICD-10-CM

## 2024-05-30 PROBLEM — M67.52 SYNOVIAL PLICA SYNDROME OF LEFT KNEE: Status: RESOLVED | Noted: 2023-11-29 | Resolved: 2024-05-30

## 2024-05-30 PROCEDURE — 97140 MANUAL THERAPY 1/> REGIONS: CPT

## 2024-05-30 PROCEDURE — 97110 THERAPEUTIC EXERCISES: CPT

## 2024-05-30 PROCEDURE — 99024 POSTOP FOLLOW-UP VISIT: CPT | Performed by: ORTHOPAEDIC SURGERY

## 2024-05-30 PROCEDURE — 97112 NEUROMUSCULAR REEDUCATION: CPT

## 2024-05-30 NOTE — PROGRESS NOTES
"Daily Note     Today's date: 2024  Patient name: Alisha Luna  : 1990  MRN: [id number]  Referring provider: Nat Martinez MD  Dx:   Encounter Diagnosis     ICD-10-CM    1. Aftercare following surgery of the musculoskeletal system  Z47.89       2. Chronic pain of left knee  M25.562     G89.29                      Subjective: Pt states feeling good after lv, f/u surgeon is today  after PT.      Objective: See treatment diary below  FOTO given (___) and Re-eval Schedule (___)      Assessment: Pt performed below TE with good tolerance and technique, no complain of pain or discomfort, however noted some hesitant with knee flexion.  Introduced s/l abd with good tolerance      Plan: Continue per plan of care.      Dx: s/p L knee arthroscopic synovial plica syndrome  EPOC: 24  CO-MORBIDITIES:  PERSONAL FACTORS:   Precautions: none    FOTO Score  Score Predication:  Date:    Score:  Date:    Score:  Date:    Score:    Manuals           L knee manual stretching/ MFR  8' 8'          K tape for support- prn                                       Neuro Re-Ed             SLS             Briges w/ pball under legs  NO PBALL 20 No pball 20          Mini squats             Sidestepping on foam beam             Tandem walking on foam beam                                       Ther Ex             Bike for knee ROM  6' half rot 6' half rot          HS stretch supine +Strap  10\" 5 10\" 5          Supine strap under thigh, Over Pressure knee flexion  10\" 5 10\" 5          Prone knee flexion +Strap  10\" 5 10\" 5          Stand HR slt bd             Gastroc stretch on slt bd             Quad sets             Supine SLR  20x 20x          Prone SLR   20x 20x          Prone knee flexion + ext  20x 20x          S/l hip abduction   20x          LAQ  5\" 20 5\"20          SAQ  5\" 20 5\" 20          Stand march             Stand HS curl             Leg press- gentle B  105# 20 105# 20          Leg press- gentle " SL             HEP instruct 8'            Ther Activity             Step up FW             Step up lat             Gait Training                                       Modalities             Cp post tx

## 2024-05-30 NOTE — ASSESSMENT & PLAN NOTE
34-year-old female status post left knee arthroscopy and resection of anteromedial plica on 4/19/2024 overall doing very well. Presurgical pain has resolved. Recommend continuing with physical therapy to continue building strengthening and maintain HEP. She can gradually increase her activities to tolerance. Ice, anti inflammatories as needed for swelling, discomfort. See patient back in 6 weeks all patients questions answered to her satisfaction.  All patient's questions were answered to her satisfaction.  This note is created using dictation transcription.  It may contain typographical errors, grammatical errors, improperly dictated words, background noise and other errors.

## 2024-05-30 NOTE — PROGRESS NOTES
Assessment:     1. Aftercare following surgery of the musculoskeletal system        Plan:     Problem List Items Addressed This Visit          Orthopedic/Musculoskeletal    Aftercare following surgery of the musculoskeletal system - Primary     34-year-old female status post left knee arthroscopy and resection of anteromedial plica on 4/19/2024 overall doing very well. Presurgical pain has resolved. Recommend continuing with physical therapy to continue building strengthening and maintain HEP. She can gradually increase her activities to tolerance. Ice, anti inflammatories as needed for swelling, discomfort. See patient back in 6 weeks all patients questions answered to her satisfaction.  All patient's questions were answered to her satisfaction.  This note is created using dictation transcription.  It may contain typographical errors, grammatical errors, improperly dictated words, background noise and other errors.           Subjective:     Patient ID: Alisha Luna is a 34 y.o. female.  Chief Complaint:  34-year-old female status post left knee arthroscopy and resection of anteromedial plica on 4/19/2024.  She just got into PT due to insurance. She states presurgical pain is gone, no locking or instability. She does have a little pain and swelling in knee today due to just coming from PT. She does work at [employer]. She is ambulating without use of any assist device.  Going down hills/declines does aggravate her knee. She is performing HEP as instructed.     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     Past Surgical History:  Past Surgical History:   Procedure Laterality Date    EGD      FL INJECTION LEFT KNEE (ARTHROGRAM)   "01/02/2024    MULTIPLE TOOTH EXTRACTIONS      OR ARTHROSCOPY KNEE DIAGNOSTIC W/WO SYNOVIAL BX SPX Left 4/19/2024    Procedure: ARTHROSCOPY KNEE WITH ANTEROMEDIAL PLICA RESECTION;  Surgeon: Nat Martinez MD;  Location:  MAIN OR;  Service: Orthopedics    WISDOM TOOTH EXTRACTION       Family History:  Family History   Problem Relation Age of Onset    Hypertension Mother     Diabetes Mother     Hypertension Father     Anesthesia problems Neg Hx      Social History:  Social History     Substance and Sexual Activity   Alcohol Use Yes    Alcohol/week: 1.0 standard drink of alcohol    Types: 1 Glasses of wine per week    Comment: very occasionally per pt     Social History     Substance and Sexual Activity   Drug Use Never    Comment: Denies any drug use per pt     Social History     Tobacco Use   Smoking Status Never   Smokeless Tobacco Never   Tobacco Comments    Never a smoker or use of any tobacco products per pt      Review of Systems   Constitutional: Negative.    HENT: Negative.     Eyes: Negative.    Respiratory: Negative.     Cardiovascular: Negative.    Gastrointestinal: Negative.    Endocrine: Negative.    Genitourinary: Negative.    Musculoskeletal:  Positive for arthralgias (Mild left knee), gait problem (Slight limp) and joint swelling (Left knee).   Skin: Negative.    Allergic/Immunologic: Negative.    Hematological: Negative.    Psychiatric/Behavioral: Negative.           Objective:  BP Readings from Last 1 Encounters:   05/30/24 116/72      Wt Readings from Last 1 Encounters:   05/30/24 68.5 kg (151 lb)      BMI:   Estimated body mass index is 30.5 kg/m² as calculated from the following:    Height as of this encounter: 4' 11\" (1.499 m).    Weight as of this encounter: 68.5 kg (151 lb).  BSA:   Estimated body surface area is 1.64 meters squared as calculated from the following:    Height as of this encounter: 4' 11\" (1.499 m).    Weight as of this encounter: 68.5 kg (151 lb).   Physical Exam  Vitals and " nursing note reviewed.   Constitutional:       Appearance: Normal appearance. She is well-developed.   HENT:      Head: Normocephalic and atraumatic.      Right Ear: External ear normal.      Left Ear: External ear normal.   Eyes:      Extraocular Movements: Extraocular movements intact.      Conjunctiva/sclera: Conjunctivae normal.   Pulmonary:      Effort: Pulmonary effort is normal.   Musculoskeletal:      Cervical back: Neck supple.      Left knee: No effusion.   Skin:     General: Skin is warm and dry.   Neurological:      Mental Status: She is alert and oriented to person, place, and time.      Deep Tendon Reflexes: Reflexes are normal and symmetric.   Psychiatric:         Mood and Affect: Mood normal.         Behavior: Behavior normal.       Left Knee Exam     Range of Motion   The patient has normal left knee ROM.    Tests   Varus: negative Valgus: negative  Patellar apprehension: negative    Other   Erythema: absent  Scars: present (well healed incisions)  Sensation: normal  Pulse: present  Swelling: mild  Effusion: no effusion present            No new images were reviewed today    Scribe Attestation      I,:  Loki Hawkins am acting as a scribe while in the presence of the attending physician.:       I,:  Nat Martinez MD personally performed the services described in this documentation    as scribed in my presence.:

## 2024-06-03 NOTE — PROGRESS NOTES
"Daily Note     Today's date: 2024  Patient name: Alisha Luna  : 1990  MRN: [id number]  Referring provider: Nat Martinez MD  Dx:   Encounter Diagnosis     ICD-10-CM    1. Aftercare following surgery of the musculoskeletal system  Z47.89       2. Chronic pain of left knee  M25.562     G89.29           Start Time: 1415  Stop Time: 1500  Total time in clinic (min): 45 minutes    Subjective: Alisha reports cont L knee stiffness. Denies any issue post last PT session. Reports compliance with HEP.       Objective: See treatment diary below      Assessment: Alisha Tolerated treatment well with appropriate muscle fatigue experienced with ex's. She is making steady gains towards goals with improved L LE strength and stability. Demonstrated good L quad control with ex's. Required vc's t/o session for proper positioning with ex's.  Alisha would benefit from continued PT and compliance with HEP.        Plan: Continue per plan of care.      Dx: s/p L knee arthroscopic synovial plica syndrome  EPOC: 24  CO-MORBIDITIES:  PERSONAL FACTORS:   Precautions: none    FOTO Score  Score Predication:  Date:    Score:  Date:    Score:  Date:    Score:    Manuals          L knee manual stretching/ MFR  8' 8' AW         K tape for support- prn                                       Neuro Re-Ed             SLS             Briges w/ pball under legs  NO PBALL 20 No pball 20 No PB 20x         Mini squats             Sidestepping on foam beam             Tandem walking on foam beam                                       Ther Ex             Bike for knee ROM  6' half rot 6' half rot 6 min half rot          HS stretch supine +Strap  10\" 5 10\" 5 10\"x5         Supine strap under thigh, Over Pressure knee flexion  10\" 5 10\" 5 10\"x5         Prone knee flexion +Strap  10\" 5 10\" 5 10\"x5         Stand HR slt bd             Gastroc stretch on slt bd             Quad sets             Supine SLR  20x 20x 20x      " "    Prone SLR   20x 20x 20x         Prone knee flexion + ext  20x 20x 20x         S/l hip abduction   20x 20x          LAQ  5\" 20 5\"20 5\"x20          SAQ  5\" 20 5\" 20 5\"x20          Stand march    2x10          Stand HS curl    2x10          Leg press- gentle B  105# 20 105# 20 105# 20x         Leg press- gentle SL             HEP instruct 8'            Ther Activity             Step up FW             Step up lat             Gait Training                                       Modalities             Cp post tx                                 "

## 2024-06-06 ENCOUNTER — OFFICE VISIT (OUTPATIENT)
Dept: PHYSICAL THERAPY | Facility: CLINIC | Age: 34
End: 2024-06-06
Payer: COMMERCIAL

## 2024-06-06 DIAGNOSIS — M25.562 CHRONIC PAIN OF LEFT KNEE: ICD-10-CM

## 2024-06-06 DIAGNOSIS — Z47.89 AFTERCARE FOLLOWING SURGERY OF THE MUSCULOSKELETAL SYSTEM: Primary | ICD-10-CM

## 2024-06-06 DIAGNOSIS — G89.29 CHRONIC PAIN OF LEFT KNEE: ICD-10-CM

## 2024-06-06 PROCEDURE — 97110 THERAPEUTIC EXERCISES: CPT

## 2024-06-06 PROCEDURE — 97140 MANUAL THERAPY 1/> REGIONS: CPT

## 2024-06-06 PROCEDURE — 97112 NEUROMUSCULAR REEDUCATION: CPT

## 2024-06-13 ENCOUNTER — OFFICE VISIT (OUTPATIENT)
Dept: PHYSICAL THERAPY | Facility: CLINIC | Age: 34
End: 2024-06-13
Payer: COMMERCIAL

## 2024-06-13 DIAGNOSIS — Z47.89 AFTERCARE FOLLOWING SURGERY OF THE MUSCULOSKELETAL SYSTEM: Primary | ICD-10-CM

## 2024-06-13 DIAGNOSIS — G89.29 CHRONIC PAIN OF LEFT KNEE: ICD-10-CM

## 2024-06-13 DIAGNOSIS — M25.562 CHRONIC PAIN OF LEFT KNEE: ICD-10-CM

## 2024-06-13 PROCEDURE — 97140 MANUAL THERAPY 1/> REGIONS: CPT | Performed by: PHYSICAL THERAPIST

## 2024-06-13 PROCEDURE — 97530 THERAPEUTIC ACTIVITIES: CPT | Performed by: PHYSICAL THERAPIST

## 2024-06-13 PROCEDURE — 97110 THERAPEUTIC EXERCISES: CPT | Performed by: PHYSICAL THERAPIST

## 2024-06-13 NOTE — PROGRESS NOTES
PT Re-Evaluation     Today's date: 2024  Patient name: Alisha Luna  : 1990  MRN: [id number]  Referring provider: Nat Martinez MD  Dx:   Encounter Diagnosis     ICD-10-CM    1. Aftercare following surgery of the musculoskeletal system  Z47.89                      Assessment  Impairments: activity intolerance, impaired physical strength and pain with function    Assessment details: Since starting skilled PT, pain levels are decrease, L knee ROM and strength are progressing well with excellent functional progress. Recommend pt continue skilled PT focusing on decreasing pain and improving strength.  Therapist cleared pt to return to the gym to exercise on the bike, leg press, HS curl machine and UE ex.  Barriers to intervention: transportation  Understanding of Dx/Px/POC: good     Prognosis: good    Goals  STG's ( 3-4 weeks)  1. Pt will be independent in HEP- met  2. Pt will have less pain walking up and down inclines at work- met  LTG's ( 6- 8 weeks)  1. Improve FOTO score by 6-8 points-met  2. Pt will have decreased pain to 2/10 at worst- met  3. Pt will be able to go up and down the steps reciprocally with less pain- met  4. Pt will have improved standing tolerance for work activities-met  5. Pt will return to riding a bike at the gym or over level surfaces recreationally- not met    Plan  Patient would benefit from: skilled physical therapy  Planned modality interventions: cryotherapy    Planned therapy interventions: joint mobilization, manual therapy, neuromuscular re-education, stretching, strengthening, therapeutic activities, therapeutic exercise, flexibility, functional ROM exercises and home exercise program    Frequency: 1x week  Duration in weeks: 8  Plan of Care beginning date: 2024  Plan of Care expiration date: 2024  Treatment plan discussed with: PTA and patient        Subjective Evaluation    History of Present Illness  Date of surgery: 2024  Mechanism of injury:  "surgery  Mechanism of injury: I.E; Pt had increased L knee pain onset . Pt had increased L knee pain after exercising at the gym. Pt went to her PCP and she did PT for her knee, with gradual worsening. Pt went to ortho in [place] and was diagnosed with PFPS. Pt returned to therapy with a modified program and her knee pain started to improve for 7 months. Pt continued to have knee weakness.     MRI testing showed \"There is a moderate sized medial patellar plica (series 2 images 14-17.)     There is also an incomplete superior patellar plica (series 6 images /.)\"    Pt had plical removal surgery on 24. Pt reports she is still limping when she is walking. Pt notices increased pain with damp and rainy weather. Pt has some difficulty going up and down the steps and has difficulty descending the steps reciprocally. Pt has tried to do the bike at the gym. Pt is able stand to prepare a meal. Pt is sleeping well. Pt is not able to squat at this time. Pt has difficulty walking long distances and going down inclines at [facility] [employer] where she works. Pt sits on a stool as needed at work.    24: Pt is compliant in HEP. Pt is able to walk longer distances at work and takes her time when walking up and down inclines. Pt is able to go up and down the steps reciprocally. Pt is using the stool less at work. Pt is able to stand to perform cooking activities in the kitchen.    Work; works in [employer] department at [employer] [employer] [employer]  Hobbies; exercise, hiking  Gait: mild antalgic gait, decreased L knee flexion  Patient Goals  Patient goals for therapy: decreased pain  Patient goal: to return to riding a bike; to be able to go up and down the steps and inclines  Pain  At best pain ratin  At worst pain ratin  Location: L knee  Quality: dull ache and tight    Social Support  Steps to enter house: yes (3)  Stairs in house: yes (1)   Lives in: one-story house    Employment status: working  Treatments  Previous " "treatment: physical therapy (prior to surgery)        Objective     Neurological Testing     Sensation     Knee   Left Knee   Intact: Light touch    Right Knee   Intact: light touch     Reflexes   Left   Achilles (S1): normal (2+)    Right   Patellar (L4): normal (2+)  Achilles (S1): normal (2+)    Comments   Left Patellar (L4): NT 2* surgery    Active Range of Motion   Left Knee   Flexion: 120 degrees   Extension: 0 degrees     Right Knee   Flexion: 143 degrees   Extension: 0 degrees     Additional Active Range of Motion Details  No TTP L knee  Mild edema surrounding surgical portholes  HS flexibility; WFL's    Passive Range of Motion   Left Knee   Flexion: 135 degrees   Extension: 0 degrees     Right Knee   Normal passive range of motion    Strength/Myotome Testing     Left Hip   Planes of Motion   Flexion: 4+  Extension: 4+  Abduction: 5  Adduction: 4+  External rotation: 4+  Internal rotation: 4+    Right Hip   Normal muscle strength    Left Knee   Flexion: 5  Extension: 4+  Quadriceps contraction: fair    Right Knee   Normal strength    Additional Strength Details  Ankle df MMT: 5/5        Dx: s/p L knee arthroscopic synovial plica syndrome  EPOC: 7/11/24  CO-MORBIDITIES:  PERSONAL FACTORS:   Precautions: none    FOTO Score  Score Predication:  Date:    Score:  Date:    Score:  Date:    Score:    Manuals 5/16 5/23 5/30 6/6 6/13        L knee manual stretching/ MFR  8' 8' AW 8'        Progress note     8'                                  Neuro Re-Ed             SLS             Briges w/ pball under legs  NO PBALL 20 No pball 20 No PB 20x         Mini squats on airex     10x5\"         Sidestepping on foam beam     2 laps        Tandem walking on foam beam     2 laps                                  Ther Ex             Bike for knee ROM  6' half rot 6' half rot 6 min half rot  5' full rev        HS stretch supine +Strap  10\" 5 10\" 5 10\"x5         Supine strap under thigh, Over Pressure knee flexion  10\" 5 10\" 5 " "10\"x5 10\"x2        Prone knee flexion +Strap  10\" 5 10\" 5 10\"x5         Stand HR slt bd     10        Gastroc stretch on slt bd     1'        Knee flexion stretch on step     10\"x5        Supine SLR  20x 20x 20x          Prone SLR   20x 20x 20x         Prone knee flexion + ext  20x 20x 20x         S/l hip abduction   20x 20x          LAQ  5\" 20 5\"20 5\"x20          SAQ  5\" 20 5\" 20 5\"x20          Stand march    2x10          Stand HS curl    2x10  25# x10        Leg press- gentle B  105# 20 105# 20 105# 20x 105# x20        Leg press- gentle SL             HEP instruct 8'            Ther Activity             Step up FW     6\"x10        Up and down 1 flight of steps     x1        Step up lat     6\"x10        Gait Training                                       Modalities             Cp post tx                                 "

## 2024-06-20 ENCOUNTER — OFFICE VISIT (OUTPATIENT)
Dept: PHYSICAL THERAPY | Facility: CLINIC | Age: 34
End: 2024-06-20
Payer: COMMERCIAL

## 2024-06-20 DIAGNOSIS — M25.562 CHRONIC PAIN OF LEFT KNEE: ICD-10-CM

## 2024-06-20 DIAGNOSIS — Z47.89 AFTERCARE FOLLOWING SURGERY OF THE MUSCULOSKELETAL SYSTEM: Primary | ICD-10-CM

## 2024-06-20 DIAGNOSIS — G89.29 CHRONIC PAIN OF LEFT KNEE: ICD-10-CM

## 2024-06-20 PROCEDURE — 97140 MANUAL THERAPY 1/> REGIONS: CPT

## 2024-06-20 PROCEDURE — 97110 THERAPEUTIC EXERCISES: CPT

## 2024-06-20 NOTE — PROGRESS NOTES
"Daily Note     Today's date: 2024  Patient name: Alisha Luna  : 1990  MRN: [id number]  Referring provider: Nat Martinez MD  Dx:   Encounter Diagnosis     ICD-10-CM    1. Aftercare following surgery of the musculoskeletal system  Z47.89       2. Chronic pain of left knee  M25.562     G89.29                      Subjective:  Patient states that she is doing well.       Objective: See treatment diary below      Assessment: Tolerated treatment well. Continued with POC. Patient demonstrated fatigue post treatment, exhibited good technique with therapeutic exercises, and would benefit from continued PT      Plan: Continue per plan of care.      Dx: s/p L knee arthroscopic synovial plica syndrome  EPOC: 24  CO-MORBIDITIES:  PERSONAL FACTORS:   Precautions: none    FOTO Score  Score Predication:  Date:    Score:  Date:    Score:  Date:    Score:          Manuals        L knee manual stretching/ MFR  8' 8' AW 8' RA       Progress note     8'                                  Neuro Re-Ed             SLS             Briges w/ pball under legs  NO PBALL 20 No pball 20 No PB 20x         Mini squats on airex     10x5\"         Sidestepping on foam beam     2 laps        Tandem walking on foam beam     2 laps                                  Ther Ex             Bike for knee ROM  6' half rot 6' half rot 6 min half rot  5' full rev 6 full        HS stretch supine +Strap  10\" 5 10\" 5 10\"x5         Supine strap under thigh, Over Pressure knee flexion  10\" 5 10\" 5 10\"x5 10\"x2 10\"x5       Prone knee flexion +Strap  10\" 5 10\" 5 10\"x5         Stand HR slt bd     10 nv       Gastroc stretch on slt bd     1' nv       Knee flexion stretch on step     10\"x5 10\"x5        Supine SLR  20x 20x 20x   20x       Prone SLR   20x 20x 20x  20x       Prone knee flexion + ext  20x 20x 20x  20x       S/l hip abduction   20x 20x   20x       LAQ  5\" 20 5\"20 5\"x20   5\"x20       SAQ  5\" 20 5\" 20 5\"x20       " "   Stand march    2x10          Stand HS curl    2x10  25# x10 25# 10x       Leg press- gentle B  105# 20 105# 20 105# 20x 105# x20 105# 20x       Leg press- gentle SL             HEP instruct 8'            Ther Activity             Step up FW     6\"x10 6\"x20       Up and down 1 flight of steps     x1        Step up lat     6\"x10 6\"x20       Gait Training                                       Modalities             Cp post tx                                 "

## 2024-06-27 ENCOUNTER — OFFICE VISIT (OUTPATIENT)
Dept: PHYSICAL THERAPY | Facility: CLINIC | Age: 34
End: 2024-06-27
Payer: COMMERCIAL

## 2024-06-27 DIAGNOSIS — Z47.89 AFTERCARE FOLLOWING SURGERY OF THE MUSCULOSKELETAL SYSTEM: Primary | ICD-10-CM

## 2024-06-27 PROCEDURE — 97110 THERAPEUTIC EXERCISES: CPT | Performed by: PHYSICAL THERAPIST

## 2024-06-27 PROCEDURE — 97112 NEUROMUSCULAR REEDUCATION: CPT | Performed by: PHYSICAL THERAPIST

## 2024-06-27 PROCEDURE — 97530 THERAPEUTIC ACTIVITIES: CPT | Performed by: PHYSICAL THERAPIST

## 2024-06-27 NOTE — PROGRESS NOTES
"Daily Note     Today's date: 2024  Patient name: Alisha Luna  : 1990  MRN: [id number]  Referring provider: Nat Martinez MD  Dx:   Encounter Diagnosis     ICD-10-CM    1. Aftercare following surgery of the musculoskeletal system  Z47.89                      Subjective: Pt reports she has been doing her exercises in her swimming pool. Pt reports her knee is feeling good.      Objective: See treatment diary below      Assessment: Tolerated treatment well. Patient exhibited good technique with therapeutic exercises. Pt tolerated increased strengthening ex. Educated pt in floor to mat transfers.      Plan: Continue per plan of care. Continue to progress strengthening ex.     Dx: s/p L knee arthroscopic synovial plica syndrome  EPOC: 24  CO-MORBIDITIES:  PERSONAL FACTORS:   Precautions: none    FOTO Score  Score Predication:  Date:    Score:  Date:    Score:  Date:    Score:          Manuals       L knee manual stretching/ MFR  8' 8' AW 8' RA 8'      Progress note     8'                                  Neuro Re-Ed             SLS       10\"x3      Briges w/ pball under legs  NO PBALL 20 No pball 20 No PB 20x   X20 no ball      Mini squats on airex     10x5\"   15 x5\"      Sidestepping on foam beam     2 laps  3 laps      Tandem walking on foam beam     2 laps  3 laps                                Ther Ex             Bike for knee ROM  6' half rot 6' half rot 6 min half rot  5' full rev 6 full  6'       HS stretch supine +Strap  10\" 5 10\" 5 10\"x5   3x20\"      Supine strap under thigh, Over Pressure knee flexion  10\" 5 10\" 5 10\"x5 10\"x2 10\"x5       Prone knee flexion +Strap  10\" 5 10\" 5 10\"x5         Stand HR slt bd     10 nv 10      Gastroc stretch on slt bd     1' nv 1'      Knee flexion stretch on step     10\"x5 10\"x5        Supine SLR  20x 20x 20x   20x 10 1.5#      Prone SLR   20x 20x 20x  20x       Prone knee flexion + ext  20x 20x 20x  20x       S/l hip " "abduction   20x 20x   20x 10 1.5#      LAQ  5\" 20 5\"20 5\"x20   5\"x20 5\"x20 2#      SAQ  5\" 20 5\" 20 5\"x20          Stand march    2x10          Stand HS curl    2x10  25# x10 25# 10x 25# 2x10      Leg press- gentle B  105# 20 105# 20 105# 20x 105# x20 105# 20x 105# x20      Leg press- gentle SL       45# x10      HEP instruct 8'            Ther Activity             Transfer training up and down from floor       x2      Step up FW     6\"x10 6\"x20 8\"x10      Up and down 1 flight of steps     x1        Step up lat     6\"x10 6\"x20 8\"x10      Gait Training                                       Modalities             Cp post tx                                      "

## 2024-07-10 ENCOUNTER — EVALUATION (OUTPATIENT)
Dept: PHYSICAL THERAPY | Facility: CLINIC | Age: 34
End: 2024-07-10
Payer: COMMERCIAL

## 2024-07-10 ENCOUNTER — APPOINTMENT (OUTPATIENT)
Dept: PHYSICAL THERAPY | Facility: CLINIC | Age: 34
End: 2024-07-10
Payer: COMMERCIAL

## 2024-07-10 DIAGNOSIS — M25.562 CHRONIC PAIN OF LEFT KNEE: ICD-10-CM

## 2024-07-10 DIAGNOSIS — G89.29 CHRONIC PAIN OF LEFT KNEE: ICD-10-CM

## 2024-07-10 DIAGNOSIS — Z47.89 AFTERCARE FOLLOWING SURGERY OF THE MUSCULOSKELETAL SYSTEM: Primary | ICD-10-CM

## 2024-07-10 PROCEDURE — 97140 MANUAL THERAPY 1/> REGIONS: CPT | Performed by: PHYSICAL THERAPIST

## 2024-07-10 PROCEDURE — 97110 THERAPEUTIC EXERCISES: CPT | Performed by: PHYSICAL THERAPIST

## 2024-07-10 NOTE — PROGRESS NOTES
PT Re-Evaluation     Today's date: 7/10/2024  Patient name: Alisha Luna  : 1990  MRN: [id number]  Referring provider: Nat Martinez MD  Dx:   Encounter Diagnosis     ICD-10-CM    1. Aftercare following surgery of the musculoskeletal system  Z47.89       2. Chronic pain of left knee  M25.562     G89.29                      Assessment  Impairments: pain with function    Assessment details: Since starting skilled PT, pain levels are decreased, L knee ROM and strength are WFL's with excellent functional progress. Recommend pt continue for one more visit with discharge to [facility].  Barriers to intervention: transportation  Understanding of Dx/Px/POC: good     Prognosis: good    Goals  STG's ( 3-4 weeks)  1. Pt will be independent in [facility]- met  2. Pt will have less pain walking up and down inclines at work- met  LTG's ( 6- 8 weeks)  1. Improve FOTO score by 6-8 points-met  2. Pt will have decreased pain to 2/10 at worst- met  3. Pt will be able to go up and down the steps reciprocally with less pain- met  4. Pt will have improved standing tolerance for work activities-met  5. Pt will return to riding a bike at the gym or over level surfaces recreationally-  met    Plan  Patient would benefit from: skilled physical therapy  Planned modality interventions: cryotherapy    Planned therapy interventions: joint mobilization, manual therapy, neuromuscular re-education, stretching, strengthening, therapeutic activities, therapeutic exercise, flexibility, functional ROM exercises and home exercise program    Frequency: 1x week (1 more visit)  Duration in weeks: 2  Plan of Care beginning date: 7/10/2024  Plan of Care expiration date: 2024  Treatment plan discussed with: PTA and patient        Subjective Evaluation    History of Present Illness  Date of surgery: 2024  Mechanism of injury: surgery  Mechanism of injury: I.E; Pt had increased L knee pain onset . Pt had increased L knee pain after exercising at  "the gym. Pt went to her PCP and she did PT for her knee, with gradual worsening. Pt went to ortho in [place] and was diagnosed with PFPS. Pt returned to therapy with a modified program and her knee pain started to improve for 7 months. Pt continued to have knee weakness.     MRI testing showed \"There is a moderate sized medial patellar plica (series 2 images 14-17.)     There is also an incomplete superior patellar plica (series 6 images 6/.)\"    Pt had plical removal surgery on 24. Pt reports she is still limping when she is walking. Pt notices increased pain with damp and rainy weather. Pt has some difficulty going up and down the steps and has difficulty descending the steps reciprocally. Pt has tried to do the bike at the gym. Pt is able stand to prepare a meal. Pt is sleeping well. Pt is not able to squat at this time. Pt has difficulty walking long distances and going down inclines at [employer] where she works. Pt sits on a stool as needed at work.    24: Pt is compliant in [facility]. Pt is able to walk longer distances at work and takes her time when walking up and down inclines. Pt is able to go up and down the steps reciprocally. Pt is using the stool less at work. Pt is able to stand to perform cooking activities in the kitchen.    7/10/24: Pt is compliant in [facility]. Pt is exercising in the swimming pool. Pt is returning to normal activities with walking, standing and sitting activities. Pt gets a \"weird\" pain in her knee, she moves it and it cracks, and then her knee feels better. Pt feels like she is able to go to the gym.     Work; works in [employer] at [facility] [employer]  Hobbies; exercise, hiking  Gait: mild antalgic gait, decreased L knee flexion  Patient Goals  Patient goals for therapy: decreased pain  Patient goal: to return to riding a bike; to be able to go up and down the steps and inclines- met  Pain  At best pain ratin  At worst pain ratin  Location: L knee  Quality: " "dull ache and tight    Social Support  Steps to enter house: yes (3)  Stairs in house: yes (1)   Lives in: one-story house    Employment status: working  Treatments  Previous treatment: physical therapy (prior to surgery)        Objective     Neurological Testing     Sensation     Knee   Left Knee   Intact: Light touch    Right Knee   Intact: light touch     Reflexes   Left   Achilles (S1): normal (2+)    Right   Patellar (L4): normal (2+)  Achilles (S1): normal (2+)    Comments   Left Patellar (L4): NT 2* surgery    Active Range of Motion   Left Knee   Flexion: 135 degrees   Extension: 0 degrees     Right Knee   Flexion: 143 degrees   Extension: 0 degrees     Additional Active Range of Motion Details  No TTP L knee  Mild edema surrounding surgical portholes  HS flexibility; WFL's    Passive Range of Motion   Left Knee   Flexion: 140 degrees   Extension: 0 degrees     Right Knee   Normal passive range of motion    Strength/Myotome Testing     Left Hip   Planes of Motion   Flexion: 4+  Extension: 4+  Abduction: 5  Adduction: 4+  External rotation: 5  Internal rotation: 5    Right Hip   Normal muscle strength    Left Knee   Flexion: 5  Extension: 5  Quadriceps contraction: good    Right Knee   Normal strength    Additional Strength Details  Ankle df MMT: 5/5      Dx: s/p L knee arthroscopic synovial plica syndrome  EPOC: 8/8/24  CO-MORBIDITIES:  PERSONAL FACTORS:   Precautions: none    FOTO Score  Score Predication:  Date:    Score:  Date:    Score:  Date:    Score:          Manuals 5/16 5/23 5/30 6/6 6/13 6/20 6/27 7/10     L knee manual stretching/ MFR  8' 8' AW 8' RA 8' 8'     Progress note     8'   8'                               Neuro Re-Ed             SLS       10\"x3 Blue 10\" 3     Briges w/ pball under legs  NO PBALL 20 No pball 20 No PB 20x   X20 no ball      Mini squats on airex     10x5\"   15 x5\"      Sidestepping on foam beam     2 laps  3 laps 3laps     Tandem walking on foam beam     2 laps  3 laps 3laps " "                              Ther Ex             Bike for knee ROM  6' half rot 6' half rot 6 min half rot  5' full rev 6 full  6'  6'     HS stretch supine +Strap  10\" 5 10\" 5 10\"x5   3x20\" 3x20\"     Supine strap under thigh, Over Pressure knee flexion  10\" 5 10\" 5 10\"x5 10\"x2 10\"x5       Prone knee flexion +Strap  10\" 5 10\" 5 10\"x5    3x20\"     Stand HR slt bd     10 nv 10      Gastroc stretch on slt bd     1' nv 1'      Knee flexion stretch on step     10\"x5 10\"x5   D/c     Supine SLR  20x 20x 20x   20x 10 1.5# 10 2#     Prone SLR   20x 20x 20x  20x       Prone knee flexion + ext  20x 20x 20x  20x       S/l hip abduction   20x 20x   20x 10 1.5# 10 2#     LAQ  5\" 20 5\"20 5\"x20   5\"x20 5\"x20 2# 20 2#     SAQ  5\" 20 5\" 20 5\"x20          Stand march    2x10          Stand HS curl    2x10  25# x10 25# 10x 25# 2x10      elliptical        1'     Leg press- gentle B  105# 20 105# 20 105# 20x 105# x20 105# 20x 105# x20 105# 20     Leg press- gentle SL       45# x10 45# 20     HEP instruct 8'            Ther Activity             Transfer training up and down from floor       x2      Step up FW     6\"x10 6\"x20 8\"x10 8\" 20     Step up lat     6\"x10 6\"x20 8\"x10 8\" 20     Up and down 1 flight of steps     x1                     Gait Training                                       Modalities             Cp post tx                                      "

## 2024-07-11 ENCOUNTER — APPOINTMENT (OUTPATIENT)
Dept: PHYSICAL THERAPY | Facility: CLINIC | Age: 34
End: 2024-07-11
Payer: COMMERCIAL

## 2024-07-18 ENCOUNTER — OFFICE VISIT (OUTPATIENT)
Dept: PHYSICAL THERAPY | Facility: CLINIC | Age: 34
End: 2024-07-18
Payer: COMMERCIAL

## 2024-07-18 DIAGNOSIS — Z47.89 AFTERCARE FOLLOWING SURGERY OF THE MUSCULOSKELETAL SYSTEM: Primary | ICD-10-CM

## 2024-07-18 DIAGNOSIS — G89.29 CHRONIC PAIN OF LEFT KNEE: ICD-10-CM

## 2024-07-18 DIAGNOSIS — M25.562 CHRONIC PAIN OF LEFT KNEE: ICD-10-CM

## 2024-07-18 PROCEDURE — 97110 THERAPEUTIC EXERCISES: CPT | Performed by: PHYSICAL THERAPIST

## 2024-07-18 PROCEDURE — 97140 MANUAL THERAPY 1/> REGIONS: CPT | Performed by: PHYSICAL THERAPIST

## 2024-07-18 PROCEDURE — 97112 NEUROMUSCULAR REEDUCATION: CPT | Performed by: PHYSICAL THERAPIST

## 2024-07-18 NOTE — PROGRESS NOTES
"Daily Note / Discharge Note    Today's date: 2024  Patient name: Alisha Luna  : 1990  MRN: [id number]  Referring provider: Nat Martinez MD  Dx:   Encounter Diagnosis     ICD-10-CM    1. Aftercare following surgery of the musculoskeletal system  Z47.89       2. Chronic pain of left knee  M25.562     G89.29                      Subjective:  Pt denies pain today. Pt has had no increase in symptoms when walking up and down hills at work/ [employer].       Objective: See treatment diary below      Assessment: Tolerated treatment well. Patient exhibited good technique with therapeutic exercises. Pt had no pain with there ex. Pt is able to stand for longer time periods when waiting in line or at work.      Plan:  D/c to [facility].         Dx: s/p L knee arthroscopic synovial plica syndrome  EPOC: 24  CO-MORBIDITIES:  PERSONAL FACTORS:   Precautions: none    FOTO Score  Score Predication:  Date:    Score:  Date:    Score:  Date:    Score:          Manuals 5/16 5/23 5/30 6/6 6/13 6/20 6/27 7/10 7/18    L knee manual stretching/ MFR  8' 8' AW 8' RA 8' 8' 8'    Progress note     8'   8'                               Neuro Re-Ed             SLS       10\"x3 Blue 10\" 3 Blue tf 10\"x3    Briges w/ pball under legs  NO PBALL 20 No pball 20 No PB 20x   X20 no ball  20    Mini squats on airex     10x5\"   15 x5\"  15x5\"    Sidestepping on foam beam     2 laps  3 laps 3laps 3 laps    Tandem walking on foam beam     2 laps  3 laps 3laps 3 laps                              Ther Ex             Bike for knee ROM  6' half rot 6' half rot 6 min half rot  5' full rev 6 full  6'  6' 6'    HS stretch supine +Strap  10\" 5 10\" 5 10\"x5   3x20\" 3x20\" 3x20\"    Supine strap under thigh, Over Pressure knee flexion  10\" 5 10\" 5 10\"x5 10\"x2 10\"x5       Prone knee flexion +Strap  10\" 5 10\" 5 10\"x5    3x20\" 3x20\"    Stand HR slt bd     10 nv 10  10    Gastroc stretch on slt bd     1' nv 1'  1'    Knee flexion stretch on step     10\"x5 " "10\"x5   D/c     Supine SLR  20x 20x 20x   20x 10 1.5# 10 2# 10 2#    Prone SLR   20x 20x 20x  20x       Prone knee flexion + ext  20x 20x 20x  20x       S/l hip abduction   20x 20x   20x 10 1.5# 10 2# 10 2#    LAQ  5\" 20 5\"20 5\"x20   5\"x20 5\"x20 2# 20 2# 20 3#    SAQ  5\" 20 5\" 20 5\"x20          Stand march    2x10          Stand HS curl    2x10  25# x10 25# 10x 25# 2x10  25# x20    elliptical        1' 2'    Leg press- gentle B  105# 20 105# 20 105# 20x 105# x20 105# 20x 105# x20 105# 20 105# x20    Leg press- gentle SL       45# x10 45# 20 45# x20    HEP instruct 8'            Ther Activity             Transfer training up and down from floor       x2      Step up FW     6\"x10 6\"x20 8\"x10 8\" 20 8\"x20    Step up lat     6\"x10 6\"x20 8\"x10 8\" 20 8\"x20    Up and down 1 flight of steps     x1                     Gait Training                                       Modalities             Cp post tx                                      "

## 2024-11-18 ENCOUNTER — PATIENT MESSAGE (OUTPATIENT)
Dept: OBGYN CLINIC | Facility: CLINIC | Age: 34
End: 2024-11-18

## 2025-04-15 ENCOUNTER — TELEPHONE (OUTPATIENT)
Dept: DERMATOLOGY | Facility: CLINIC | Age: 35
End: 2025-04-15

## 2025-04-15 NOTE — TELEPHONE ENCOUNTER
.LMOM with location change (Dr Becerril 10/22/25 [facility] to Dr Becerril 08/07/25 [facility]) due to her no longer going to the [facility] office, to call the office to confirm or r/s. When pt calls back schedule appropriately, I do not have anything held.

## 2025-08-07 ENCOUNTER — OFFICE VISIT (OUTPATIENT)
Dept: DERMATOLOGY | Facility: CLINIC | Age: 35
End: 2025-08-07

## 2025-08-07 VITALS — TEMPERATURE: 98.5 F | BODY MASS INDEX: 31.19 KG/M2 | HEIGHT: 59 IN | WEIGHT: 154.7 LBS

## 2025-08-07 DIAGNOSIS — D23.9 DERMATOFIBROMA: ICD-10-CM

## 2025-08-07 DIAGNOSIS — D18.01 CHERRY ANGIOMA: ICD-10-CM

## 2025-08-07 DIAGNOSIS — D22.5 MULTIPLE BENIGN MELANOCYTIC NEVI OF UPPER AND LOWER EXTREMITIES AND TRUNK: ICD-10-CM

## 2025-08-07 DIAGNOSIS — D22.71 MULTIPLE BENIGN MELANOCYTIC NEVI OF UPPER AND LOWER EXTREMITIES AND TRUNK: ICD-10-CM

## 2025-08-07 DIAGNOSIS — D22.61 MULTIPLE BENIGN MELANOCYTIC NEVI OF UPPER AND LOWER EXTREMITIES AND TRUNK: ICD-10-CM

## 2025-08-07 DIAGNOSIS — Z12.83 SKIN CANCER SCREENING: ICD-10-CM

## 2025-08-07 DIAGNOSIS — L57.8 OTHER SKIN CHANGES DUE TO CHRONIC EXPOSURE TO NONIONIZING RADIATION: ICD-10-CM

## 2025-08-07 DIAGNOSIS — D48.9 NEOPLASM OF UNCERTAIN BEHAVIOR: Primary | ICD-10-CM

## 2025-08-07 DIAGNOSIS — D22.62 MULTIPLE BENIGN MELANOCYTIC NEVI OF UPPER AND LOWER EXTREMITIES AND TRUNK: ICD-10-CM

## 2025-08-07 DIAGNOSIS — L81.4 LENTIGINES: ICD-10-CM

## 2025-08-07 DIAGNOSIS — D22.72 MULTIPLE BENIGN MELANOCYTIC NEVI OF UPPER AND LOWER EXTREMITIES AND TRUNK: ICD-10-CM

## 2025-08-07 DIAGNOSIS — D18.01 HEMANGIOMA OF SKIN: ICD-10-CM

## 2025-08-07 RX ORDER — ASCORBIC ACID 125 MG
TABLET,CHEWABLE ORAL
COMMUNITY

## 2025-08-07 RX ORDER — FLUTICASONE PROPIONATE 50 MCG
1 SPRAY, SUSPENSION (ML) NASAL DAILY
COMMUNITY

## 2025-08-07 RX ORDER — BETAMETHASONE VALERATE 0.1 %
1 LOTION (ML) TOPICAL
COMMUNITY
Start: 2025-02-27

## 2025-08-07 RX ORDER — VIT C/B6/B5/MAGNESIUM/HERB 173 50-5-6-5MG
CAPSULE ORAL
COMMUNITY

## 2025-08-14 ENCOUNTER — RESULTS FOLLOW-UP (OUTPATIENT)
Dept: DERMATOLOGY | Facility: CLINIC | Age: 35
End: 2025-08-14

## (undated) DEVICE — IMPERVIOUS STOCKINETTE: Brand: DEROYAL

## (undated) DEVICE — SUT ETHILON 3-0 PS-1 18 IN 1663H

## (undated) DEVICE — GLOVE SRG BIOGEL ORTHOPEDIC 7.5

## (undated) DEVICE — SYRINGE 30ML LL

## (undated) DEVICE — GLOVE SRG BIOGEL 7.5

## (undated) DEVICE — PAD CAST 6 IN COTTON NON STERILE

## (undated) DEVICE — GLOVE INDICATOR PI UNDERGLOVE SZ 7.5 BLUE

## (undated) DEVICE — NEEDLE HYPO 22G X 1-1/2 IN

## (undated) DEVICE — CUFF TOURNIQUET 30 X 4 IN QUICK CONNECT DISP 1BLA

## (undated) DEVICE — INTENDED FOR TISSUE SEPARATION, AND OTHER PROCEDURES THAT REQUIRE A SHARP SURGICAL BLADE TO PUNCTURE OR CUT.: Brand: BARD-PARKER SAFETY BLADES SIZE 11, STERILE

## (undated) DEVICE — NEEDLE 18 G X 1 1/2

## (undated) DEVICE — BLADE SHAVER EXCALIBUR 4MM 13CM COOLCUT

## (undated) DEVICE — SYRINGE 3ML LL

## (undated) DEVICE — GLOVE INDICATOR PI UNDERGLOVE SZ 7 BLUE

## (undated) DEVICE — TUBING ARTHROSCOPIC WAVE  MAIN PUMP

## (undated) DEVICE — TUBING SUCTION 5MM X 12 FT

## (undated) DEVICE — GLOVE SRG BIOGEL ECLIPSE 7

## (undated) DEVICE — GLOVE SRG BIOGEL 7

## (undated) DEVICE — CAST PADDING 6 IN STERILE

## (undated) DEVICE — FILTER STRAW 1.7

## (undated) DEVICE — OCCLUSIVE GAUZE STRIP,3% BISMUTH TRIBROMOPHENATE IN PETROLATUM BLEND: Brand: XEROFORM

## (undated) DEVICE — BETHLEHEM UNIVERSAL  ARTHRO PK: Brand: CARDINAL HEALTH

## (undated) DEVICE — GLOVE INDICATOR PI UNDERGLOVE SZ 8 BLUE

## (undated) DEVICE — ACE WRAP 6 IN UNSTERILE

## (undated) DEVICE — CHLORAPREP HI-LITE 26ML ORANGE